# Patient Record
Sex: FEMALE | Race: WHITE | NOT HISPANIC OR LATINO | Employment: UNEMPLOYED | URBAN - METROPOLITAN AREA
[De-identification: names, ages, dates, MRNs, and addresses within clinical notes are randomized per-mention and may not be internally consistent; named-entity substitution may affect disease eponyms.]

---
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
## 2022-07-18 ENCOUNTER — TELEPHONE (OUTPATIENT)
Dept: GASTROENTEROLOGY | Facility: CLINIC | Age: 22
End: 2022-07-18
Payer: MEDICAID

## 2022-07-18 NOTE — TELEPHONE ENCOUNTER
Gave appt for 9/9/2022 with Mariajose Morales.      ----- Message from Angie Reyes-Ruiz, MA sent at 7/18/2022 11:51 AM CDT -----    ----- Message -----  From: Roque Redmond  Sent: 7/18/2022  11:50 AM CDT  To: Effie Billy Staff    Vesta Ye is referring the following patient to Mariajose. The patient's diagnosis is (generalized colicky abdominal pain).     The patients primary insurance is Medicaid insurance. When I attempted to schedule an appointment in Epic, the system could not find an available slot.     If possible, can you please confirm if there are available appointments or if we will need to decline the following referral? I will notify the referring provider.     Thank you,     Roque Redmond  [facility]

## 2022-09-09 ENCOUNTER — OFFICE VISIT (OUTPATIENT)
Dept: GASTROENTEROLOGY | Facility: CLINIC | Age: 22
End: 2022-09-09
Payer: MEDICAID

## 2022-09-09 VITALS
HEART RATE: 80 BPM | BODY MASS INDEX: 22.83 KG/M2 | OXYGEN SATURATION: 97 % | WEIGHT: 120.94 LBS | HEIGHT: 61 IN | DIASTOLIC BLOOD PRESSURE: 59 MMHG | SYSTOLIC BLOOD PRESSURE: 116 MMHG

## 2022-09-09 DIAGNOSIS — R14.0 ABDOMINAL BLOATING: ICD-10-CM

## 2022-09-09 DIAGNOSIS — K21.9 GASTROESOPHAGEAL REFLUX DISEASE, UNSPECIFIED WHETHER ESOPHAGITIS PRESENT: Primary | ICD-10-CM

## 2022-09-09 DIAGNOSIS — R10.10 PAIN OF UPPER ABDOMEN: ICD-10-CM

## 2022-09-09 PROCEDURE — 99999 PR PBB SHADOW E&M-EST. PATIENT-LVL III: CPT | Mod: PBBFAC,,, | Performed by: NURSE PRACTITIONER

## 2022-09-09 PROCEDURE — 1159F MED LIST DOCD IN RCRD: CPT | Mod: CPTII,,, | Performed by: NURSE PRACTITIONER

## 2022-09-09 PROCEDURE — 3078F DIAST BP <80 MM HG: CPT | Mod: CPTII,,, | Performed by: NURSE PRACTITIONER

## 2022-09-09 PROCEDURE — 3008F PR BODY MASS INDEX (BMI) DOCUMENTED: ICD-10-PCS | Mod: CPTII,,, | Performed by: NURSE PRACTITIONER

## 2022-09-09 PROCEDURE — 99213 OFFICE O/P EST LOW 20 MIN: CPT | Mod: PBBFAC,PN | Performed by: NURSE PRACTITIONER

## 2022-09-09 PROCEDURE — 1159F PR MEDICATION LIST DOCUMENTED IN MEDICAL RECORD: ICD-10-PCS | Mod: CPTII,,, | Performed by: NURSE PRACTITIONER

## 2022-09-09 PROCEDURE — 3074F PR MOST RECENT SYSTOLIC BLOOD PRESSURE < 130 MM HG: ICD-10-PCS | Mod: CPTII,,, | Performed by: NURSE PRACTITIONER

## 2022-09-09 PROCEDURE — 99204 PR OFFICE/OUTPT VISIT, NEW, LEVL IV, 45-59 MIN: ICD-10-PCS | Mod: S$PBB,,, | Performed by: NURSE PRACTITIONER

## 2022-09-09 PROCEDURE — 3078F PR MOST RECENT DIASTOLIC BLOOD PRESSURE < 80 MM HG: ICD-10-PCS | Mod: CPTII,,, | Performed by: NURSE PRACTITIONER

## 2022-09-09 PROCEDURE — 99204 OFFICE O/P NEW MOD 45 MIN: CPT | Mod: S$PBB,,, | Performed by: NURSE PRACTITIONER

## 2022-09-09 PROCEDURE — 99999 PR PBB SHADOW E&M-EST. PATIENT-LVL III: ICD-10-PCS | Mod: PBBFAC,,, | Performed by: NURSE PRACTITIONER

## 2022-09-09 PROCEDURE — 3074F SYST BP LT 130 MM HG: CPT | Mod: CPTII,,, | Performed by: NURSE PRACTITIONER

## 2022-09-09 PROCEDURE — 3008F BODY MASS INDEX DOCD: CPT | Mod: CPTII,,, | Performed by: NURSE PRACTITIONER

## 2022-09-09 RX ORDER — SERTRALINE HYDROCHLORIDE 50 MG/1
TABLET, FILM COATED ORAL
COMMUNITY
Start: 2022-07-08 | End: 2022-11-18

## 2022-09-09 RX ORDER — BENZOYL PEROXIDE 50 MG/ML
LIQUID TOPICAL
COMMUNITY
Start: 2022-03-09

## 2022-09-09 RX ORDER — DICYCLOMINE HYDROCHLORIDE 10 MG/1
10 CAPSULE ORAL 3 TIMES DAILY PRN
COMMUNITY
Start: 2022-07-08 | End: 2022-11-18

## 2022-09-09 RX ORDER — OMEPRAZOLE 40 MG/1
40 CAPSULE, DELAYED RELEASE ORAL DAILY
COMMUNITY
Start: 2022-07-08 | End: 2022-11-18

## 2022-09-09 NOTE — PROGRESS NOTES
GASTROENTEROLOGY CLINIC NOTE    Chief Complaint: The primary encounter diagnosis was Gastroesophageal reflux disease, unspecified whether esophagitis present. Diagnoses of Pain of upper abdomen and Abdominal bloating were also pertinent to this visit.  Referring provider/PCP: Vesta Ye NP    HPI:  Inessa Salgado is a 21 y.o. female who is a new patient to me with a PMH that's significant for abdominal pain and anxiety and is accompanied by her mother. She was referred to us by her PCP and is here today to establish care for abdominal pain, reflux, and bloating.  Reports she has had h/o GI problems since she was 17 yo but feels symptoms have worsened over the last two years since starting Depo-Provera.  Symptoms occur daily; never goes one day without abdominal pain. It waxes and wanes throughout the day but never completely resolves. Pain is primarily located in the upper abdomen and occurs prior to eating and after eating.  Pain described as stabbing and cramping in nature. It is worse with eating and having a bowel movement.  Greasy food and spicy foods trigger symptoms. It is accompanied by bloating, gas, reflux, nausea, regurgitation, and belching.  She is unsure of the duration of the pain.  Bowel movements occur every two days and are hard in consistency. Incomplete emptying with bowel movements but no straining. Denies melena or hematochezia.  She sought care with PCP about two months ago for abdominal bloating and epigastric burning.  At that time bentyl and omeprazole were initiated. Patient is not taking either medication and states she wants to find out what is causing her symptoms not hide the problem.  She is extremely fixated on the fact that the Depo is causing her symptoms.     Treatments Tried:Pepto Bismol, no improvement in symptoms  NSAIDs: No  Anticoagulation or Antiplatelet: No    Prior Upper Endoscopy: No  Prior Colonoscopy: No  Family h/o Colon Cancer: No  Family h/o Crohn's  Disease or Ulcerative Colitis: No  Family h/o Celiac Sprue: No  Abdominal Surgeries: None    Review of Systems   Constitutional:  Negative for weight loss.   HENT:  Negative for sore throat.    Eyes:  Negative for blurred vision.   Respiratory:  Negative for cough.    Cardiovascular:  Negative for chest pain.   Gastrointestinal:  Positive for abdominal pain (upper/epigastric), heartburn, nausea and vomiting. Negative for blood in stool, constipation, diarrhea and melena.   Genitourinary:  Negative for dysuria.   Musculoskeletal:  Negative for myalgias.   Skin:  Negative for rash.   Neurological:  Negative for headaches.   Endo/Heme/Allergies:  Negative for environmental allergies.   Psychiatric/Behavioral:  Negative for suicidal ideas. The patient is not nervous/anxious.      Past Medical History: has a past medical history of Seizures.    Past Surgical History: has no past surgical history on file.    Family History:family history includes Heart disease in her mother; Hyperlipidemia in her father and mother; Hypertension in her father.    Allergies:   Review of patient's allergies indicates:   Allergen Reactions    Augmentin [amoxicillin-pot clavulanate] Hives    Morphine     Suprax [cefixime]        Social History: reports that she has never smoked. She has never used smokeless tobacco. She reports that she does not currently use alcohol. She reports that she does not use drugs.    Home medications:   Current Outpatient Medications on File Prior to Visit   Medication Sig Dispense Refill    benzoyl peroxide 5 % external liquid WASH FACE EVERY DAY AS DIRECTED      medroxyPROGESTERone (DEPO-PROVERA) 150 mg/mL Syrg INJECT ONE ML EVERY 12 WEEKS      dicyclomine (BENTYL) 10 MG capsule Take 10 mg by mouth 3 (three) times daily as needed.      omeprazole (PRILOSEC) 40 MG capsule Take 40 mg by mouth once daily.      sertraline (ZOLOFT) 50 MG tablet Take by mouth.      [DISCONTINUED] ondansetron (ZOFRAN-ODT) 4 MG TbDL Take  "1 tablet (4 mg total) by mouth every 6 (six) hours as needed (nausea and/or vomiting). (Patient not taking: Reported on 9/9/2022) 16 tablet 0     No current facility-administered medications on file prior to visit.       Vital signs:  BP (!) 116/59 (BP Location: Right arm, Patient Position: Lying, BP Method: Medium (Automatic))   Pulse 80   Ht 5' 1" (1.549 m)   Wt 54.9 kg (120 lb 14.8 oz)   LMP  (LMP Unknown)   SpO2 97%   BMI 22.85 kg/m²     Physical Exam  Vitals reviewed.   Constitutional:       General: She is not in acute distress.     Appearance: Normal appearance. She is not ill-appearing.   HENT:      Head: Normocephalic.   Cardiovascular:      Rate and Rhythm: Normal rate and regular rhythm.      Heart sounds: Normal heart sounds. No murmur heard.  Pulmonary:      Effort: Pulmonary effort is normal. No respiratory distress.      Breath sounds: Normal breath sounds.   Chest:      Chest wall: No tenderness.   Abdominal:      General: Bowel sounds are normal. There is no distension.      Palpations: Abdomen is soft.      Tenderness: There is no abdominal tenderness. Negative signs include Arellano's sign.      Hernia: No hernia is present.   Skin:     General: Skin is warm.   Neurological:      Mental Status: She is alert and oriented to person, place, and time.   Psychiatric:         Mood and Affect: Mood normal.         Behavior: Behavior normal.       Routine labs:  Lab Results   Component Value Date    WBC 10.00 04/09/2021    HGB 12.4 04/09/2021    HCT 38.2 04/09/2021    MCV 83 04/09/2021     04/09/2021     No results found for: INR  No results found for: IRON, FERRITIN, TIBC, FESATURATED  Lab Results   Component Value Date     04/09/2021    K 4.1 04/09/2021     04/09/2021    CO2 26 04/09/2021    BUN 14 04/09/2021    CREATININE 0.7 04/09/2021     Lab Results   Component Value Date    ALBUMIN 4.3 04/09/2021    ALT 24 04/09/2021    AST 19 04/09/2021    ALKPHOS 94 04/09/2021    BILITOT " 0.3 04/09/2021     No results found for: GLUCOSE  No results found for: TSH  Lab Results   Component Value Date    CALCIUM 9.0 04/09/2021       Imaging:      I have reviewed prior labs, imaging, and notes.      Assessment:  1. Gastroesophageal reflux disease, unspecified whether esophagitis present    2. Pain of upper abdomen    3. Abdominal bloating        Plan:  Orders Placed This Encounter    X-Ray Abdomen AP 1 View    Pregnancy, urine rapid    Case Request Endoscopy: EGD (ESOPHAGOGASTRODUODENOSCOPY)     Abdominal xray to evaluate stool burden  EGD to further evaluate bloating, reflux, and epigastric pain. Consider biopsy for H.pylori and/or celiac sprue.  Trial IBgard    Recommend starting omeprazole prescribed by PCP. Patient refuses to start medication stating she does not want to keep taking medications without knowing what's causing her problem. Explained medications will not mask the cause of the problem but will help symptoms.   Patient takes sertraline occasionally. Informed she has to take it daily and recommend following up with PCP if she no longer wishes to take it.   Follow up with OBGYN to discuss other contraceptive measures as she strongly believes the Depo is causing her symptoms     Plan of care discussed with patient who is in agreement and verbalized understanding.     I have explained the planned procedures to the patient.The risks, benefits and alternatives of the procedure were also explained in detail. Patient verbalized understanding, all questions were answered. The patient agrees to proceed as planned    Follow Up: As Needed Pending Above Workup          Mariajose Chou, ABELARDO,FNP-BC  [facility]

## 2022-11-15 ENCOUNTER — TELEPHONE (OUTPATIENT)
Dept: GASTROENTEROLOGY | Facility: CLINIC | Age: 22
End: 2022-11-15
Payer: MEDICAID

## 2022-11-15 NOTE — TELEPHONE ENCOUNTER
Patient given the results. Patient has appt with Mariajose Morales NP on 1/13/2023    ----- Message from Sarai Partida MA sent at 11/15/2022 11:26 AM CST -----    ----- Message -----  From: Shakira Zazueta  Sent: 11/15/2022  11:19 AM CST  To: Jose Trujillo Staff    Type:  Test Results    Who Called: pt   Name of Test (Lab/Mammo/Etc):   Date of Test: 11/3  Ordering Provider:   Where the test was performed: [facility]   Would the patient rather a call back or a response via [facility]? Call   Best Call Back Number: [phone]  Additional Information:  Pt calling to follow up on her results. Pt states on mychart it says gastric antral and body mucosa. Pt wants to see what are her next steps and if she needs medication.

## 2022-11-16 ENCOUNTER — TELEPHONE (OUTPATIENT)
Dept: GASTROENTEROLOGY | Facility: CLINIC | Age: 22
End: 2022-11-16
Payer: MEDICAID

## 2022-11-16 NOTE — TELEPHONE ENCOUNTER
Gave patient appt for Mariajose Morales on 11/18/2022 at 10:00 am.      ----- Message from Yfn Marcos MA sent at 11/16/2022  9:59 AM CST -----  Regarding: FW: Urgent  Contact: [phone]    ----- Message -----  From: Lisset Lo  Sent: 11/16/2022   9:55 AM CST  To: Effie Billy Staff  Subject: Urgent                                           Renee ( Mother) is calling on behalf of Inessa because she calling stating that she has been throwing up since yesterday and this morning. She had surgery on the 11/3. She's requesting for a soon appointment. Please call to discuss further @ [phone]

## 2022-11-16 NOTE — TELEPHONE ENCOUNTER
----- Message from Ronnie Garcia MD sent at 11/15/2022 12:15 PM CST -----  Pathologic findings for recent EGD reviewed.  No evidence of H pylori infection identified.  Continue current medications.  Follow up as needed with Mariajose

## 2022-11-18 ENCOUNTER — OFFICE VISIT (OUTPATIENT)
Dept: GASTROENTEROLOGY | Facility: CLINIC | Age: 22
End: 2022-11-18
Payer: MEDICAID

## 2022-11-18 VITALS — BODY MASS INDEX: 22.75 KG/M2 | WEIGHT: 120.5 LBS | HEIGHT: 61 IN

## 2022-11-18 DIAGNOSIS — R11.2 NAUSEA AND VOMITING, UNSPECIFIED VOMITING TYPE: ICD-10-CM

## 2022-11-18 DIAGNOSIS — R14.0 ABDOMINAL BLOATING: Primary | ICD-10-CM

## 2022-11-18 DIAGNOSIS — R10.84 GENERALIZED ABDOMINAL PAIN: ICD-10-CM

## 2022-11-18 PROCEDURE — 99999 PR PBB SHADOW E&M-EST. PATIENT-LVL III: CPT | Mod: PBBFAC,,, | Performed by: NURSE PRACTITIONER

## 2022-11-18 PROCEDURE — 3008F PR BODY MASS INDEX (BMI) DOCUMENTED: ICD-10-PCS | Mod: CPTII,,, | Performed by: NURSE PRACTITIONER

## 2022-11-18 PROCEDURE — 1159F PR MEDICATION LIST DOCUMENTED IN MEDICAL RECORD: ICD-10-PCS | Mod: CPTII,,, | Performed by: NURSE PRACTITIONER

## 2022-11-18 PROCEDURE — 99999 PR PBB SHADOW E&M-EST. PATIENT-LVL III: ICD-10-PCS | Mod: PBBFAC,,, | Performed by: NURSE PRACTITIONER

## 2022-11-18 PROCEDURE — 1159F MED LIST DOCD IN RCRD: CPT | Mod: CPTII,,, | Performed by: NURSE PRACTITIONER

## 2022-11-18 PROCEDURE — 99214 PR OFFICE/OUTPT VISIT, EST, LEVL IV, 30-39 MIN: ICD-10-PCS | Mod: S$PBB,,, | Performed by: NURSE PRACTITIONER

## 2022-11-18 PROCEDURE — 99213 OFFICE O/P EST LOW 20 MIN: CPT | Mod: PBBFAC,PN | Performed by: NURSE PRACTITIONER

## 2022-11-18 PROCEDURE — 99214 OFFICE O/P EST MOD 30 MIN: CPT | Mod: S$PBB,,, | Performed by: NURSE PRACTITIONER

## 2022-11-18 PROCEDURE — 3008F BODY MASS INDEX DOCD: CPT | Mod: CPTII,,, | Performed by: NURSE PRACTITIONER

## 2022-11-18 RX ORDER — LEVONORGESTREL AND ETHINYL ESTRADIOL 0.1-0.02MG
1 KIT ORAL DAILY
COMMUNITY
Start: 2022-11-12 | End: 2023-09-29

## 2022-11-18 RX ORDER — OMEPRAZOLE 40 MG/1
40 CAPSULE, DELAYED RELEASE ORAL DAILY
Qty: 30 CAPSULE | Refills: 11 | Status: SHIPPED | OUTPATIENT
Start: 2022-11-18 | End: 2023-09-29

## 2022-11-18 NOTE — PATIENT INSTRUCTIONS
Omeprazole instruction:    Capsule may be opened and contents mixed with 1 tablespoon of applesauce (soft enough to swallow without chewing). Swallow immediately with a glass of cool water; mixture should not be chewed, crushed, warmed, or saved for future use.

## 2022-11-18 NOTE — PROGRESS NOTES
GASTROENTEROLOGY CLINIC NOTE    Chief Complaint: The primary encounter diagnosis was Abdominal bloating. A diagnosis of Nausea and vomiting, unspecified vomiting type was also pertinent to this visit.  Referring provider/PCP: Vesta Ye NP    HPI:  Inessa Salgado is a 22 y.o. female who is a new patient to me with a PMH that's significant for abdominal pain and anxiety and is accompanied by her mother. She was referred to us by her PCP and is here today to establish care for abdominal pain, reflux, and bloating.  Reports she has had h/o GI problems since she was 15 yo but feels symptoms have worsened over the last two years since starting Depo-Provera.  Symptoms occur daily; never goes one day without abdominal pain. It waxes and wanes throughout the day but never completely resolves. Pain is primarily located in the upper abdomen and occurs prior to eating and after eating.  Pain described as stabbing and cramping in nature. It is worse with eating and having a bowel movement.  Greasy food and spicy foods trigger symptoms. It is accompanied by bloating, gas, reflux, nausea, regurgitation, and belching.  She is unsure of the duration of the pain.  Bowel movements occur every two days and are hard in consistency. Incomplete emptying with bowel movements but no straining. Denies melena or hematochezia.  She sought care with PCP about two months ago for abdominal bloating and epigastric burning.  At that time bentyl and omeprazole were initiated. Patient is not taking either medication and states she wants to find out what is causing her symptoms not hide the problem.  She is extremely fixated on the fact that the Depo is causing her symptoms.     Interval Note 11/18/2022  Inessa Salgado who is known to me presents for follow up regarding abdominal pain, bloating, nausea, and vomiting. She is accompanied by her mother. Following her last appointment, it was recommended she start omeprazole to help  "with bloating/reflux.  She has not taken the omeprazole.  She also underwent EGD with Dr. Garcia which was unrevealing.  She continues to experience mid to lower abdominal pain described as stabbing/cramping in nature.  It is accompanied by nausea, vomiting, belching, decreased appetite, and early satiety.  Bowel movements occur every two days and are generally soft in consistency.  Sometimes will have Paullina Type 1 bowel movements. Has not noted correlation with symptoms and harder stools.  Some improvement in abdominal pain noted after bowel movements.  Nausea and vomiting primarily occur with bowel movements.  Describes "flare" abdominal pain, urge to have bowel movement, nausea, then vomiting during bowel movement. Vomitus consists of mucus, liquid, and some old food from previous meals.  Nausea mostly occurs prior to bowel movement but may also occur after eating. No melena or hematochezia, or nocturnal symptoms.  Denies frequent NSAID use, ETOH use, THC, or caffeine use.       Treatments Tried:Pepto Bismol, no improvement in symptoms  NSAIDs: No  Anticoagulation or Antiplatelet: No    Prior Upper Endoscopy: 11/3/2022 with Dr. Garcia  Impression:            - Normal esophagus.                          - Normal stomach. Biopsied.                          - Normal examined duodenum.     Recommendation:        - Discharge patient to home.                          - Resume previous diet.                          - Continue present medications.                          - Await pathology results.                          - Return to nurse practitioner PRN.  Pathology:  Stomach, biopsies:   - Gastric antral and body mucosa with no histopathologic abnormalities    Comment:  Negative for atrophy or intestinal metaplasia.  Negative for   dysplasia or malignancy.     Prior Colonoscopy: No  Family h/o Colon Cancer: No  Family h/o Crohn's Disease or Ulcerative Colitis: No  Family h/o Celiac Sprue: No  Abdominal " Surgeries: None    Review of Systems   Constitutional:  Negative for weight loss.   HENT:  Negative for sore throat.    Eyes:  Negative for blurred vision.   Respiratory:  Negative for cough.    Cardiovascular:  Negative for chest pain.   Gastrointestinal:  Positive for abdominal pain (upper/epigastric), heartburn, nausea and vomiting. Negative for blood in stool, constipation, diarrhea and melena.   Genitourinary:  Negative for dysuria.   Musculoskeletal:  Negative for myalgias.   Skin:  Negative for rash.   Neurological:  Negative for headaches.   Endo/Heme/Allergies:  Negative for environmental allergies.   Psychiatric/Behavioral:  Negative for suicidal ideas. The patient is not nervous/anxious.      Past Medical History: has a past medical history of Seizures.    Past Surgical History: has a past surgical history that includes Esophagogastroduodenoscopy (11/03/2022) and Esophagogastroduodenoscopy (N/A, 11/3/2022).    Family History:family history includes Heart disease in her mother; Hyperlipidemia in her father and mother; Hypertension in her father.    Allergies:   Review of patient's allergies indicates:   Allergen Reactions    Augmentin [amoxicillin-pot clavulanate] Hives    Morphine     Suprax [cefixime]        Social History: reports that she has never smoked. She has never used smokeless tobacco. She reports current alcohol use. She reports that she does not use drugs.    Home medications:   Current Outpatient Medications on File Prior to Visit   Medication Sig Dispense Refill    BALCOLTRA 0.1 mg-0.02 mg (21)/36.5 mg(7) Tab Take 1 tablet by mouth once daily.      benzoyl peroxide 5 % external liquid WASH FACE EVERY DAY AS DIRECTED      [DISCONTINUED] dicyclomine (BENTYL) 10 MG capsule Take 10 mg by mouth 3 (three) times daily as needed.      [DISCONTINUED] medroxyPROGESTERone (DEPO-PROVERA) 150 mg/mL Syrg INJECT ONE ML EVERY 12 WEEKS      [DISCONTINUED] omeprazole (PRILOSEC) 40 MG capsule Take 40 mg by  "mouth once daily.      [DISCONTINUED] sertraline (ZOLOFT) 50 MG tablet Take by mouth.       Current Facility-Administered Medications on File Prior to Visit   Medication Dose Route Frequency Provider Last Rate Last Admin    0.9%  NaCl infusion   Intravenous Continuous Ronnie Garcia MD        LIDOcaine (PF) 10 mg/ml (1%) injection 10 mg  1 mL Intradermal Once PRN Ronnie Garcia MD           Vital signs:  Ht 5' 1" (1.549 m)   Wt 54.6 kg (120 lb 7.7 oz)   BMI 22.76 kg/m²     Physical Exam  Vitals reviewed.   Constitutional:       General: She is not in acute distress.     Appearance: Normal appearance. She is not ill-appearing.   HENT:      Head: Normocephalic.   Cardiovascular:      Rate and Rhythm: Normal rate and regular rhythm.      Heart sounds: Normal heart sounds. No murmur heard.  Pulmonary:      Effort: Pulmonary effort is normal. No respiratory distress.      Breath sounds: Normal breath sounds.   Chest:      Chest wall: No tenderness.   Abdominal:      General: Bowel sounds are normal. There is no distension.      Palpations: Abdomen is soft.      Tenderness: There is no abdominal tenderness. Negative signs include Arellano's sign.      Hernia: No hernia is present.   Skin:     General: Skin is warm.   Neurological:      Mental Status: She is alert and oriented to person, place, and time.   Psychiatric:         Mood and Affect: Mood normal.         Behavior: Behavior normal.       Routine labs:  Lab Results   Component Value Date    WBC 10.00 04/09/2021    HGB 12.4 04/09/2021    HCT 38.2 04/09/2021    MCV 83 04/09/2021     04/09/2021     No results found for: INR  No results found for: IRON, FERRITIN, TIBC, FESATURATED  Lab Results   Component Value Date     04/09/2021    K 4.1 04/09/2021     04/09/2021    CO2 26 04/09/2021    BUN 14 04/09/2021    CREATININE 0.7 04/09/2021     Lab Results   Component Value Date    ALBUMIN 4.3 04/09/2021    ALT 24 04/09/2021    AST 19 " 04/09/2021    ALKPHOS 94 04/09/2021    BILITOT 0.3 04/09/2021     No results found for: GLUCOSE  No results found for: TSH  Lab Results   Component Value Date    CALCIUM 9.0 04/09/2021       Imaging:      I have reviewed prior labs, imaging, and notes.      Assessment:  1. Abdominal bloating    2. Nausea and vomiting, unspecified vomiting type          Plan:  Orders Placed This Encounter    NM Gastric Emptying    CBC Auto Differential    Comprehensive Metabolic Panel    TSH    Tissue Transglutaminase, IgA    IgA    H. pylori Antibody, IgG    omeprazole (PRILOSEC) 40 MG capsule     CBC, CMP, TSH  Celiac labs, H. Pylori IgG  Gastric Emptying Study  Start omeprazole 40 mg daily.     Plan of care discussed with patient who is in agreement and verbalized understanding.     I have explained the planned procedures to the patient.The risks, benefits and alternatives of the procedure were also explained in detail. Patient verbalized understanding, all questions were answered. The patient agrees to proceed as planned    Follow Up: 2 months          ABELARDO Solis,FNP-BC  [facility] [place]/[facility] [place]

## 2023-01-20 ENCOUNTER — OFFICE VISIT (OUTPATIENT)
Dept: GASTROENTEROLOGY | Facility: CLINIC | Age: 23
End: 2023-01-20
Payer: MEDICAID

## 2023-01-20 VITALS
HEIGHT: 61 IN | DIASTOLIC BLOOD PRESSURE: 52 MMHG | BODY MASS INDEX: 23.14 KG/M2 | WEIGHT: 122.56 LBS | SYSTOLIC BLOOD PRESSURE: 104 MMHG | OXYGEN SATURATION: 99 % | HEART RATE: 64 BPM

## 2023-01-20 DIAGNOSIS — K59.00 CONSTIPATION, UNSPECIFIED CONSTIPATION TYPE: ICD-10-CM

## 2023-01-20 DIAGNOSIS — R10.10 PAIN OF UPPER ABDOMEN: Primary | ICD-10-CM

## 2023-01-20 PROCEDURE — 3008F BODY MASS INDEX DOCD: CPT | Mod: CPTII,,, | Performed by: NURSE PRACTITIONER

## 2023-01-20 PROCEDURE — 3078F PR MOST RECENT DIASTOLIC BLOOD PRESSURE < 80 MM HG: ICD-10-PCS | Mod: CPTII,,, | Performed by: NURSE PRACTITIONER

## 2023-01-20 PROCEDURE — 99213 OFFICE O/P EST LOW 20 MIN: CPT | Mod: PBBFAC,PN | Performed by: NURSE PRACTITIONER

## 2023-01-20 PROCEDURE — 99214 PR OFFICE/OUTPT VISIT, EST, LEVL IV, 30-39 MIN: ICD-10-PCS | Mod: S$PBB,,, | Performed by: NURSE PRACTITIONER

## 2023-01-20 PROCEDURE — 3074F PR MOST RECENT SYSTOLIC BLOOD PRESSURE < 130 MM HG: ICD-10-PCS | Mod: CPTII,,, | Performed by: NURSE PRACTITIONER

## 2023-01-20 PROCEDURE — 99999 PR PBB SHADOW E&M-EST. PATIENT-LVL III: CPT | Mod: PBBFAC,,, | Performed by: NURSE PRACTITIONER

## 2023-01-20 PROCEDURE — 3008F PR BODY MASS INDEX (BMI) DOCUMENTED: ICD-10-PCS | Mod: CPTII,,, | Performed by: NURSE PRACTITIONER

## 2023-01-20 PROCEDURE — 99999 PR PBB SHADOW E&M-EST. PATIENT-LVL III: ICD-10-PCS | Mod: PBBFAC,,, | Performed by: NURSE PRACTITIONER

## 2023-01-20 PROCEDURE — 99214 OFFICE O/P EST MOD 30 MIN: CPT | Mod: S$PBB,,, | Performed by: NURSE PRACTITIONER

## 2023-01-20 PROCEDURE — 3074F SYST BP LT 130 MM HG: CPT | Mod: CPTII,,, | Performed by: NURSE PRACTITIONER

## 2023-01-20 PROCEDURE — 3078F DIAST BP <80 MM HG: CPT | Mod: CPTII,,, | Performed by: NURSE PRACTITIONER

## 2023-01-20 NOTE — PROGRESS NOTES
GASTROENTEROLOGY CLINIC NOTE    Chief Complaint: The primary encounter diagnosis was Pain of upper abdomen. A diagnosis of Constipation, unspecified constipation type was also pertinent to this visit.  Referring provider/PCP: Vesta Ye NP    HPI:  Inessa Salgado is a 22 y.o. female who is a new patient to me with a PMH that's significant for abdominal pain and anxiety and is accompanied by her mother. She was referred to us by her PCP and is here today to establish care for abdominal pain, reflux, and bloating.  Reports she has had h/o GI problems since she was 17 yo but feels symptoms have worsened over the last two years since starting Depo-Provera.  Symptoms occur daily; never goes one day without abdominal pain. It waxes and wanes throughout the day but never completely resolves. Pain is primarily located in the upper abdomen and occurs prior to eating and after eating.  Pain described as stabbing and cramping in nature. It is worse with eating and having a bowel movement.  Greasy food and spicy foods trigger symptoms. It is accompanied by bloating, gas, reflux, nausea, regurgitation, and belching.  She is unsure of the duration of the pain.  Bowel movements occur every two days and are hard in consistency. Incomplete emptying with bowel movements but no straining. Denies melena or hematochezia.  She sought care with PCP about two months ago for abdominal bloating and epigastric burning.  At that time bentyl and omeprazole were initiated. Patient is not taking either medication and states she wants to find out what is causing her symptoms not hide the problem.  She is extremely fixated on the fact that the Depo is causing her symptoms.     Interval Note 11/18/2022  Inessa Salgado who is known to me presents for follow up regarding abdominal pain, bloating, nausea, and vomiting. She is accompanied by her mother. Following her last appointment, it was recommended she start omeprazole to help  "with bloating/reflux.  She has not taken the omeprazole.  She also underwent EGD with Dr. Garcia which was unrevealing.  She continues to experience mid to lower abdominal pain described as stabbing/cramping in nature.  It is accompanied by nausea, vomiting, belching, decreased appetite, and early satiety.  Bowel movements occur every two days and are generally soft in consistency.  Sometimes will have [place] Type 1 bowel movements. Has not noted correlation with symptoms and harder stools.  Some improvement in abdominal pain noted after bowel movements.  Nausea and vomiting primarily occur with bowel movements.  Describes "flare" abdominal pain, urge to have bowel movement, nausea, then vomiting during bowel movement. Vomitus consists of mucus, liquid, and some old food from previous meals.  Nausea mostly occurs prior to bowel movement but may also occur after eating. No melena or hematochezia, or nocturnal symptoms.  Denies frequent NSAID use, ETOH use, THC, or caffeine use.     Interval Note 1/20/2023  Inessa Salgado who is known to me presents to clinic for follow up for abdominal pain, nausea, and vomiting.  She is accompanied by her mother and boyfriend.  Following her last appointment, it was recommended she start omeprazole daily and she was sent for gastric emptying study. Gastric emptying study was normal. Celiac labs and H.pylori were also obtained and were normal.  Today she reports constipation, straining, and generalized abdominal pain with bowel movements.  Nausea and vomiting occur with eating greasy food and when having a bowel movement.  Bowel movements occur every 2 days and are small and hard in consistency.  She is no longer taking omeprazole b/c she may be pregnant.  Stopped birth control in December and has not had menstrual cycle since November.  Unable to tell me if omeprazole helped symptoms as she did not take in consistently.      Treatments Tried:Pepto Bismol, Bentyl, Omeprazole, " IBgard  NSAIDs: No  Anticoagulation or Antiplatelet: No    Prior Upper Endoscopy: 11/3/2022 with Dr. Garcia  Impression:            - Normal esophagus.                          - Normal stomach. Biopsied.                          - Normal examined duodenum.     Recommendation:        - Discharge patient to home.                          - Resume previous diet.                          - Continue present medications.                          - Await pathology results.                          - Return to nurse practitioner PRN.  Pathology:  Stomach, biopsies:   - Gastric antral and body mucosa with no histopathologic abnormalities    Comment:  Negative for atrophy or intestinal metaplasia.  Negative for   dysplasia or malignancy.     Prior Colonoscopy: No  Family h/o Colon Cancer: No  Family h/o Crohn's Disease or Ulcerative Colitis: No  Family h/o Celiac Sprue: No  Abdominal Surgeries: None    Review of Systems   Constitutional:  Negative for weight loss.   HENT:  Negative for sore throat.    Eyes:  Negative for blurred vision.   Respiratory:  Negative for cough.    Cardiovascular:  Negative for chest pain.   Gastrointestinal:  Positive for abdominal pain (upper/epigastric), heartburn, nausea and vomiting. Negative for blood in stool, constipation, diarrhea and melena.   Genitourinary:  Negative for dysuria.   Musculoskeletal:  Negative for myalgias.   Skin:  Negative for rash.   Neurological:  Negative for headaches.   Endo/Heme/Allergies:  Negative for environmental allergies.   Psychiatric/Behavioral:  Negative for suicidal ideas. The patient is not nervous/anxious.      Past Medical History: has a past medical history of Seizures.    Past Surgical History: has a past surgical history that includes Esophagogastroduodenoscopy (11/03/2022) and Esophagogastroduodenoscopy (N/A, 11/3/2022).    Family History:family history includes Heart disease in her mother; Hyperlipidemia in her father and mother; Hypertension in  "her father.    Allergies:   Review of patient's allergies indicates:   Allergen Reactions    Augmentin [amoxicillin-pot clavulanate] Hives    Morphine     Suprax [cefixime]        Social History: reports that she has never smoked. She has never used smokeless tobacco. She reports current alcohol use. She reports that she does not use drugs.    Home medications:   Current Outpatient Medications on File Prior to Visit   Medication Sig Dispense Refill    BALCOLTRA 0.1 mg-0.02 mg (21)/36.5 mg(7) Tab Take 1 tablet by mouth once daily.      benzoyl peroxide 5 % external liquid WASH FACE EVERY DAY AS DIRECTED      omeprazole (PRILOSEC) 40 MG capsule Take 1 capsule (40 mg total) by mouth once daily. 30 capsule 11     Current Facility-Administered Medications on File Prior to Visit   Medication Dose Route Frequency Provider Last Rate Last Admin    0.9%  NaCl infusion   Intravenous Continuous Ronnie Garcia MD        LIDOcaine (PF) 10 mg/ml (1%) injection 10 mg  1 mL Intradermal Once PRN Ronnie Garcia MD           Vital signs:  BP (!) 104/52 (BP Location: Left arm, Patient Position: Sitting, BP Method: Medium (Automatic))   Pulse 64   Ht 5' 1" (1.549 m)   Wt 55.6 kg (122 lb 9.2 oz)   LMP 12/10/2022   SpO2 99%   BMI 23.16 kg/m²     Physical Exam  Vitals reviewed.   Constitutional:       General: She is not in acute distress.     Appearance: Normal appearance. She is not ill-appearing.   HENT:      Head: Normocephalic.   Cardiovascular:      Rate and Rhythm: Normal rate and regular rhythm.      Heart sounds: Normal heart sounds. No murmur heard.  Pulmonary:      Effort: Pulmonary effort is normal. No respiratory distress.      Breath sounds: Normal breath sounds.   Chest:      Chest wall: No tenderness.   Abdominal:      General: Bowel sounds are normal. There is no distension.      Palpations: Abdomen is soft.      Tenderness: There is no abdominal tenderness. Negative signs include Arellano's sign.    "   Hernia: No hernia is present.   Skin:     General: Skin is warm.   Neurological:      Mental Status: She is alert and oriented to person, place, and time.   Psychiatric:         Mood and Affect: Mood normal.         Behavior: Behavior normal.       Routine labs:  Lab Results   Component Value Date    WBC 8.21 11/18/2022    HGB 13.2 11/18/2022    HCT 42.4 11/18/2022    MCV 87 11/18/2022     11/18/2022     No results found for: INR  No results found for: IRON, FERRITIN, TIBC, FESATURATED  Lab Results   Component Value Date     11/18/2022    K 4.4 11/18/2022     11/18/2022    CO2 24 11/18/2022    BUN 13 11/18/2022    CREATININE 0.8 11/18/2022     Lab Results   Component Value Date    ALBUMIN 4.3 11/18/2022    ALT 21 11/18/2022    AST 17 11/18/2022    ALKPHOS 94 11/18/2022    BILITOT 0.3 11/18/2022     No results found for: GLUCOSE  Lab Results   Component Value Date    TSH 1.314 11/18/2022     Lab Results   Component Value Date    CALCIUM 9.8 11/18/2022      Latest Reference Range & Units 11/18/22 11:08   H Pylori IgG Interp Negative  Negative @[facility]      Latest Reference Range & Units 11/18/22 11:08   TTG IgA <20 UNITS 9   IgA 40 - 350 mg/dL 137       Imaging:  EXAMINATION:  NM GASTRIC EMPTYING     CLINICAL HISTORY:  Abdominal distension (gaseous)     FINDINGS:  The patient was administered 1.1 mCi of technetium 9 M labeled sulfur colloid with scrambled egg whites 2 pieces of toast jelly and water.     The% gastric emptying at 04:00 hours is 93%, 7% retention.  There is no reflux or aspiration.     Impression:     Normal gastric emptying with 7% retention at 04:00 hours.        Electronically signed by: Valentino Obregon MD  Date:                                            12/19/2022  Time:                                           14:48    I have reviewed prior labs, imaging, and notes.      Assessment:  1. Pain of upper abdomen    2. Constipation, unspecified constipation type             Plan:  Orders Placed This Encounter    US Abdomen Limited     Ultrasound  Suspect IBS playing a role in symptoms.  Will evaluate gallbladder with ultrasound as upper abdominal pain and nausea/vomiting occur following meals nicol heavier meals.    Recommend starting Miralax daily until bowel movements become more regular then take as needed.   Increase water intake.   Offered UPT. Patient declined stating she took home pregnancy test today.  Advised patient to follow up with OBGYN if she does not start her menstrual cycle.       Plan of care discussed with patient who is in agreement and verbalized understanding.     I have explained the planned procedures to the patient.The risks, benefits and alternatives of the procedure were also explained in detail. Patient verbalized understanding, all questions were answered. The patient agrees to proceed as planned    Follow Up: As Needed          Mariajose Chou, ABELARDO,FNP-BC  [facility]

## 2023-01-20 NOTE — PATIENT INSTRUCTIONS
Abdominal ultrasound    Start Miralax daily until bowel movements become more regular then you can take as needed.

## 2023-01-27 ENCOUNTER — TELEPHONE (OUTPATIENT)
Dept: GASTROENTEROLOGY | Facility: CLINIC | Age: 23
End: 2023-01-27
Payer: MEDICAID

## 2023-01-27 NOTE — TELEPHONE ENCOUNTER
Patient given the results and instructions per Mariajose Morales NP. Patient verbalized understanding.    ----- Message from Mariajose Morales NP sent at 1/26/2023  5:32 PM CST -----  Please let patient know her ultrasound was normal. Her gallbladder is fine and there are no gallstones. Symptoms are due to IBS. She can take Miralax as needed for constipation. I also recommend IBgard and daily Metamucil.

## 2023-02-28 PROBLEM — N39.0 URINARY TRACT INFECTION WITHOUT HEMATURIA: Status: ACTIVE | Noted: 2023-02-28

## 2023-02-28 PROBLEM — R10.9 RIGHT FLANK PAIN: Status: ACTIVE | Noted: 2023-02-28

## 2023-06-05 PROBLEM — N39.0 URINARY TRACT INFECTION WITHOUT HEMATURIA: Status: RESOLVED | Noted: 2023-02-28 | Resolved: 2023-06-05

## 2023-06-16 ENCOUNTER — NURSE TRIAGE (OUTPATIENT)
Dept: ADMINISTRATIVE | Facility: CLINIC | Age: 23
End: 2023-06-16
Payer: MEDICAID

## 2023-06-16 NOTE — TELEPHONE ENCOUNTER
Pt's Mother calls on behalf of pt who is 10 weeks pregnant and was told yesterday by her OBGYN that the baby is . Pt's Mother states that she was told that she would have to wait for an extended period of time before a D&C could be performed. Mother notes that pt currently is extremely pale and weak. Pt then comes on the line and states that she feels too weak to stand and is experiencing cold, pale, clammy skin. She denies any bleeding at this time. Pt's Mother asks if a D&C could be scheduled at [facility] asap. They agree to answer triage questions based upon pt's symptoms.     Care Advice recommends that pt call  now. Both pt and pt's Mother verbalize understanding and are instructed to call back with any new/worsening sxs, questions, or concerns.   Reason for Disposition   Shock suspected (e.g., cold/pale/clammy skin, too weak to stand, low BP, rapid pulse)    Protocols used:  - Threatened Miscarriage Follow-up Call-A-[place]

## 2023-06-17 ENCOUNTER — HOSPITAL ENCOUNTER (EMERGENCY)
Facility: OTHER | Age: 23
Discharge: HOME | End: 2023-06-17
Attending: EMERGENCY MEDICINE
Payer: MEDICAID

## 2023-06-17 VITALS
HEIGHT: 61 IN | DIASTOLIC BLOOD PRESSURE: 58 MMHG | WEIGHT: 126 LBS | RESPIRATION RATE: 18 BRPM | SYSTOLIC BLOOD PRESSURE: 109 MMHG | HEART RATE: 75 BPM | TEMPERATURE: 98 F | OXYGEN SATURATION: 99 % | BODY MASS INDEX: 23.79 KG/M2

## 2023-06-17 DIAGNOSIS — O02.1 MISSED ABORTION: Primary | ICD-10-CM

## 2023-06-17 LAB
ABO + RH BLD: NORMAL
ALBUMIN SERPL BCP-MCNC: 3.7 G/DL (ref 3.5–5.2)
ALP SERPL-CCNC: 82 U/L (ref 55–135)
ALT SERPL W/O P-5'-P-CCNC: 18 U/L (ref 10–44)
ANION GAP SERPL CALC-SCNC: 9 MMOL/L (ref 8–16)
AST SERPL-CCNC: 15 U/L (ref 10–40)
B-HCG UR QL: POSITIVE
BASOPHILS # BLD AUTO: 0.04 K/UL (ref 0–0.2)
BASOPHILS NFR BLD: 0.4 % (ref 0–1.9)
BILIRUB SERPL-MCNC: 0.2 MG/DL (ref 0.1–1)
BUN SERPL-MCNC: 9 MG/DL (ref 6–20)
CALCIUM SERPL-MCNC: 9.5 MG/DL (ref 8.7–10.5)
CHLORIDE SERPL-SCNC: 105 MMOL/L (ref 95–110)
CO2 SERPL-SCNC: 21 MMOL/L (ref 23–29)
CREAT SERPL-MCNC: 0.7 MG/DL (ref 0.5–1.4)
CTP QC/QA: YES
DIFFERENTIAL METHOD: ABNORMAL
EOSINOPHIL # BLD AUTO: 0.1 K/UL (ref 0–0.5)
EOSINOPHIL NFR BLD: 1 % (ref 0–8)
ERYTHROCYTE [DISTWIDTH] IN BLOOD BY AUTOMATED COUNT: 13 % (ref 11.5–14.5)
EST. GFR  (NO RACE VARIABLE): >60 ML/MIN/1.73 M^2
GLUCOSE SERPL-MCNC: 123 MG/DL (ref 70–110)
HCG INTACT+B SERPL-ACNC: NORMAL MIU/ML
HCT VFR BLD AUTO: 38.9 % (ref 37–48.5)
HGB BLD-MCNC: 12.5 G/DL (ref 12–16)
IMM GRANULOCYTES # BLD AUTO: 0.04 K/UL (ref 0–0.04)
IMM GRANULOCYTES NFR BLD AUTO: 0.4 % (ref 0–0.5)
LYMPHOCYTES # BLD AUTO: 2.3 K/UL (ref 1–4.8)
LYMPHOCYTES NFR BLD: 21.6 % (ref 18–48)
MCH RBC QN AUTO: 27.5 PG (ref 27–31)
MCHC RBC AUTO-ENTMCNC: 32.1 G/DL (ref 32–36)
MCV RBC AUTO: 86 FL (ref 82–98)
MONOCYTES # BLD AUTO: 0.7 K/UL (ref 0.3–1)
MONOCYTES NFR BLD: 6.8 % (ref 4–15)
NEUTROPHILS # BLD AUTO: 7.4 K/UL (ref 1.8–7.7)
NEUTROPHILS NFR BLD: 69.8 % (ref 38–73)
NRBC BLD-RTO: 0 /100 WBC
PLATELET # BLD AUTO: 295 K/UL (ref 150–450)
PMV BLD AUTO: 9 FL (ref 9.2–12.9)
POTASSIUM SERPL-SCNC: 3.7 MMOL/L (ref 3.5–5.1)
PROT SERPL-MCNC: 7.3 G/DL (ref 6–8.4)
RBC # BLD AUTO: 4.54 M/UL (ref 4–5.4)
SODIUM SERPL-SCNC: 135 MMOL/L (ref 136–145)
WBC # BLD AUTO: 10.53 K/UL (ref 3.9–12.7)

## 2023-06-17 PROCEDURE — 84702 CHORIONIC GONADOTROPIN TEST: CPT | Performed by: PHYSICIAN ASSISTANT

## 2023-06-17 PROCEDURE — 63600175 PHARM REV CODE 636 W HCPCS: Performed by: PHYSICIAN ASSISTANT

## 2023-06-17 PROCEDURE — 96374 THER/PROPH/DIAG INJ IV PUSH: CPT

## 2023-06-17 PROCEDURE — 99285 EMERGENCY DEPT VISIT HI MDM: CPT | Mod: 25

## 2023-06-17 PROCEDURE — 25000003 PHARM REV CODE 250: Performed by: PHYSICIAN ASSISTANT

## 2023-06-17 PROCEDURE — 96361 HYDRATE IV INFUSION ADD-ON: CPT

## 2023-06-17 PROCEDURE — 81025 URINE PREGNANCY TEST: CPT | Performed by: EMERGENCY MEDICINE

## 2023-06-17 PROCEDURE — 80053 COMPREHEN METABOLIC PANEL: CPT | Performed by: PHYSICIAN ASSISTANT

## 2023-06-17 PROCEDURE — 85025 COMPLETE CBC W/AUTO DIFF WBC: CPT | Performed by: PHYSICIAN ASSISTANT

## 2023-06-17 PROCEDURE — 86900 BLOOD TYPING SEROLOGIC ABO: CPT

## 2023-06-17 RX ORDER — ONDANSETRON 4 MG/1
4 TABLET, ORALLY DISINTEGRATING ORAL EVERY 6 HOURS PRN
Qty: 30 TABLET | Refills: 2 | Status: SHIPPED | OUTPATIENT
Start: 2023-06-17

## 2023-06-17 RX ORDER — ACETAMINOPHEN 325 MG/1
650 TABLET ORAL EVERY 6 HOURS
Qty: 56 TABLET | Refills: 0 | Status: SHIPPED | OUTPATIENT
Start: 2023-06-17 | End: 2023-06-24

## 2023-06-17 RX ORDER — IBUPROFEN 600 MG/1
600 TABLET ORAL EVERY 6 HOURS
Qty: 28 TABLET | Refills: 0 | OUTPATIENT
Start: 2023-06-17 | End: 2023-06-18

## 2023-06-17 RX ORDER — MISOPROSTOL 200 UG/1
800 TABLET ORAL
Qty: 8 TABLET | Refills: 0 | Status: SHIPPED | OUTPATIENT
Start: 2023-06-17 | End: 2023-09-29

## 2023-06-17 RX ORDER — ONDANSETRON 2 MG/ML
4 INJECTION INTRAMUSCULAR; INTRAVENOUS
Status: COMPLETED | OUTPATIENT
Start: 2023-06-17 | End: 2023-06-17

## 2023-06-17 RX ADMIN — ONDANSETRON 4 MG: 2 INJECTION INTRAMUSCULAR; INTRAVENOUS at 07:06

## 2023-06-17 RX ADMIN — SODIUM CHLORIDE 1000 ML: 9 INJECTION, SOLUTION INTRAVENOUS at 04:06

## 2023-06-17 NOTE — ED TRIAGE NOTES
Pt arrived with c/o lightheadedness since yesterday and one episode of vomiting today.  Pt states LMP 4/1/23, states she had a positive pregnancy test in May.  PT denies any abd pain or vaginal bleeding. Pt answering questions appropriately, speaking in complete sentences, respirations even and unlabored.  Aao x 4.

## 2023-06-17 NOTE — FIRST PROVIDER EVALUATION
Emergency Department TeleTriage Encounter Note      CHIEF COMPLAINT    Chief Complaint   Patient presents with    Fatigue     Took a + pregnancy test in the middle of may, unable to give specific date. Pt is unsure how far along she is. Denies bleeding. Reports feeling fatigued with N/V and increased thirst. Denies other symptoms. Requesting ultrasound and HCG level. Hasn't been able to F/U with obgyn yet.        VITAL SIGNS   Initial Vitals [23 1434]   BP Pulse Resp Temp SpO2   (!) 109/59 93 20 98.4 °F (36.9 °C) 98 %      MAP       --            ALLERGIES    Review of patient's allergies indicates:   Allergen Reactions    Augmentin [amoxicillin-pot clavulanate] Hives    Morphine     Suprax [cefixime]        PROVIDER TRIAGE NOTE  Patient with LMP early April and reports positive pregnancy test about 3 weeks ago. She is complaining of nausea, vomiting and generalized weakness. Denies pelvic pain, back pain or vaginal bleeding       ORDERS  Labs Reviewed - No data to display    ED Orders (720h ago, onward)      None              Virtual Visit Note: The provider triage portion of this emergency department evaluation and documentation was performed via [employer], a HIPAA-compliant telemedicine application, in concert with a tele-presenter in the room. A face to face patient evaluation with one of my colleagues will occur once the patient is placed in an emergency department room.      DISCLAIMER: This note was prepared with [employer] voice recognition transcription software. Garbled syntax, mangled pronouns, and other bizarre constructions may be attributed to that software system.

## 2023-06-18 ENCOUNTER — HOSPITAL ENCOUNTER (EMERGENCY)
Facility: OTHER | Age: 23
Discharge: HOME | End: 2023-06-18
Attending: EMERGENCY MEDICINE
Payer: MEDICAID

## 2023-06-18 ENCOUNTER — NURSE TRIAGE (OUTPATIENT)
Dept: ADMINISTRATIVE | Facility: CLINIC | Age: 23
End: 2023-06-18
Payer: MEDICAID

## 2023-06-18 VITALS
DIASTOLIC BLOOD PRESSURE: 58 MMHG | TEMPERATURE: 98 F | RESPIRATION RATE: 21 BRPM | OXYGEN SATURATION: 97 % | HEART RATE: 102 BPM | SYSTOLIC BLOOD PRESSURE: 112 MMHG

## 2023-06-18 DIAGNOSIS — T50.905A MEDICATION REACTION, INITIAL ENCOUNTER: ICD-10-CM

## 2023-06-18 DIAGNOSIS — R07.9 CHEST PAIN: ICD-10-CM

## 2023-06-18 DIAGNOSIS — O03.9 COMPLETE MISCARRIAGE: Primary | ICD-10-CM

## 2023-06-18 LAB
ALBUMIN SERPL BCP-MCNC: 3 G/DL (ref 3.5–5.2)
ALP SERPL-CCNC: 64 U/L (ref 55–135)
ALT SERPL W/O P-5'-P-CCNC: 15 U/L (ref 10–44)
ANION GAP SERPL CALC-SCNC: 9 MMOL/L (ref 8–16)
AST SERPL-CCNC: 18 U/L (ref 10–40)
BASOPHILS # BLD AUTO: 0.03 K/UL (ref 0–0.2)
BASOPHILS NFR BLD: 0.2 % (ref 0–1.9)
BILIRUB SERPL-MCNC: 0.3 MG/DL (ref 0.1–1)
BUN SERPL-MCNC: 8 MG/DL (ref 6–20)
CALCIUM SERPL-MCNC: 8.2 MG/DL (ref 8.7–10.5)
CHLORIDE SERPL-SCNC: 107 MMOL/L (ref 95–110)
CO2 SERPL-SCNC: 18 MMOL/L (ref 23–29)
CREAT SERPL-MCNC: 0.6 MG/DL (ref 0.5–1.4)
DIFFERENTIAL METHOD: ABNORMAL
EOSINOPHIL # BLD AUTO: 0.1 K/UL (ref 0–0.5)
EOSINOPHIL NFR BLD: 0.5 % (ref 0–8)
ERYTHROCYTE [DISTWIDTH] IN BLOOD BY AUTOMATED COUNT: 13.2 % (ref 11.5–14.5)
EST. GFR  (NO RACE VARIABLE): >60 ML/MIN/1.73 M^2
GLUCOSE SERPL-MCNC: 101 MG/DL (ref 70–110)
HCT VFR BLD AUTO: 35 % (ref 37–48.5)
HGB BLD-MCNC: 11.2 G/DL (ref 12–16)
IMM GRANULOCYTES # BLD AUTO: 0.07 K/UL (ref 0–0.04)
IMM GRANULOCYTES NFR BLD AUTO: 0.5 % (ref 0–0.5)
LYMPHOCYTES # BLD AUTO: 2.7 K/UL (ref 1–4.8)
LYMPHOCYTES NFR BLD: 17.4 % (ref 18–48)
MCH RBC QN AUTO: 27.2 PG (ref 27–31)
MCHC RBC AUTO-ENTMCNC: 32 G/DL (ref 32–36)
MCV RBC AUTO: 85 FL (ref 82–98)
MONOCYTES # BLD AUTO: 1.1 K/UL (ref 0.3–1)
MONOCYTES NFR BLD: 6.9 % (ref 4–15)
NEUTROPHILS # BLD AUTO: 11.6 K/UL (ref 1.8–7.7)
NEUTROPHILS NFR BLD: 74.5 % (ref 38–73)
NRBC BLD-RTO: 0 /100 WBC
PLATELET # BLD AUTO: 310 K/UL (ref 150–450)
PMV BLD AUTO: 10 FL (ref 9.2–12.9)
POTASSIUM SERPL-SCNC: 3.9 MMOL/L (ref 3.5–5.1)
PROT SERPL-MCNC: 5.9 G/DL (ref 6–8.4)
RBC # BLD AUTO: 4.12 M/UL (ref 4–5.4)
SODIUM SERPL-SCNC: 134 MMOL/L (ref 136–145)
WBC # BLD AUTO: 15.5 K/UL (ref 3.9–12.7)

## 2023-06-18 PROCEDURE — 96361 HYDRATE IV INFUSION ADD-ON: CPT

## 2023-06-18 PROCEDURE — 96374 THER/PROPH/DIAG INJ IV PUSH: CPT

## 2023-06-18 PROCEDURE — 63600175 PHARM REV CODE 636 W HCPCS: Performed by: EMERGENCY MEDICINE

## 2023-06-18 PROCEDURE — 88300 PR  SURG PATH,GROSS,LEVEL I: ICD-10-PCS | Mod: 26,,, | Performed by: STUDENT IN AN ORGANIZED HEALTH CARE EDUCATION/TRAINING PROGRAM

## 2023-06-18 PROCEDURE — 93005 ELECTROCARDIOGRAM TRACING: CPT

## 2023-06-18 PROCEDURE — 96375 TX/PRO/DX INJ NEW DRUG ADDON: CPT

## 2023-06-18 PROCEDURE — 80053 COMPREHEN METABOLIC PANEL: CPT | Mod: 91 | Performed by: EMERGENCY MEDICINE

## 2023-06-18 PROCEDURE — 85025 COMPLETE CBC W/AUTO DIFF WBC: CPT | Performed by: EMERGENCY MEDICINE

## 2023-06-18 PROCEDURE — 93010 ELECTROCARDIOGRAM REPORT: CPT | Mod: ,,, | Performed by: INTERNAL MEDICINE

## 2023-06-18 PROCEDURE — 88305 TISSUE EXAM BY PATHOLOGIST: CPT | Performed by: STUDENT IN AN ORGANIZED HEALTH CARE EDUCATION/TRAINING PROGRAM

## 2023-06-18 PROCEDURE — 25000003 PHARM REV CODE 250: Performed by: EMERGENCY MEDICINE

## 2023-06-18 PROCEDURE — 36415 COLL VENOUS BLD VENIPUNCTURE: CPT | Performed by: EMERGENCY MEDICINE

## 2023-06-18 PROCEDURE — 96376 TX/PRO/DX INJ SAME DRUG ADON: CPT

## 2023-06-18 PROCEDURE — 93010 EKG 12-LEAD: ICD-10-PCS | Mod: ,,, | Performed by: INTERNAL MEDICINE

## 2023-06-18 PROCEDURE — 99285 EMERGENCY DEPT VISIT HI MDM: CPT | Mod: 25

## 2023-06-18 PROCEDURE — 88300 SURGICAL PATH GROSS: CPT | Mod: 26,,, | Performed by: STUDENT IN AN ORGANIZED HEALTH CARE EDUCATION/TRAINING PROGRAM

## 2023-06-18 RX ORDER — HYDROMORPHONE HYDROCHLORIDE 1 MG/ML
0.5 INJECTION, SOLUTION INTRAMUSCULAR; INTRAVENOUS; SUBCUTANEOUS
Status: COMPLETED | OUTPATIENT
Start: 2023-06-18 | End: 2023-06-18

## 2023-06-18 RX ORDER — DIPHENHYDRAMINE HYDROCHLORIDE 50 MG/ML
25 INJECTION INTRAMUSCULAR; INTRAVENOUS
Status: COMPLETED | OUTPATIENT
Start: 2023-06-18 | End: 2023-06-18

## 2023-06-18 RX ORDER — IBUPROFEN 800 MG/1
800 TABLET ORAL EVERY 6 HOURS PRN
Qty: 30 TABLET | Refills: 0 | Status: SHIPPED | OUTPATIENT
Start: 2023-06-18 | End: 2023-09-29

## 2023-06-18 RX ORDER — HYDROCODONE BITARTRATE AND ACETAMINOPHEN 5; 325 MG/1; MG/1
1 TABLET ORAL EVERY 4 HOURS PRN
Qty: 12 TABLET | Refills: 0 | Status: SHIPPED | OUTPATIENT
Start: 2023-06-18 | End: 2023-06-28

## 2023-06-18 RX ORDER — KETOROLAC TROMETHAMINE 30 MG/ML
15 INJECTION, SOLUTION INTRAMUSCULAR; INTRAVENOUS
Status: COMPLETED | OUTPATIENT
Start: 2023-06-18 | End: 2023-06-18

## 2023-06-18 RX ADMIN — HYDROMORPHONE HYDROCHLORIDE 0.5 MG: 0.5 INJECTION, SOLUTION INTRAMUSCULAR; INTRAVENOUS; SUBCUTANEOUS at 05:06

## 2023-06-18 RX ADMIN — SODIUM CHLORIDE 1000 ML: 9 INJECTION, SOLUTION INTRAVENOUS at 05:06

## 2023-06-18 RX ADMIN — KETOROLAC TROMETHAMINE 15 MG: 30 INJECTION, SOLUTION INTRAMUSCULAR; INTRAVENOUS at 07:06

## 2023-06-18 RX ADMIN — DIPHENHYDRAMINE HYDROCHLORIDE 25 MG: 50 INJECTION, SOLUTION INTRAMUSCULAR; INTRAVENOUS at 05:06

## 2023-06-18 NOTE — TELEPHONE ENCOUNTER
Pts mother calling with pt, pt crying in background. States the medication isnt working and she is in a lot of pain. States she is having chills and is too weak to stand. Per protocol advised to CALL 911 NOW. verbalized understanding. Denies any further questions or concerns at this time, advised to call back if they have any that come up. Advised pt to call back with any other concerns or worsening symptoms. Verbalized understanding and will route message to provider.     Reason for Disposition   Shock suspected (e.g., cold/pale/clammy skin, too weak to stand, low BP, rapid pulse)    Protocols used:  - Medication Induced Follow-up Call-A-

## 2023-06-18 NOTE — DISCHARGE INSTRUCTIONS
You were seen in the ER and were diagnosed with having a miscarriage.  Ob talked to you about your options and you elected to treat this with Cytotec.  Follow the instructions that they gave you for this treatment.  They prescribed you ibuprofen, Tylenol, and Zofran that you may take as needed for pain and nausea.  Follow up with OB as instructed.  Return to the ER immediately if you began having heavy vaginal bleeding with clots with associated lightheadedness or fever.

## 2023-06-18 NOTE — ED PROVIDER NOTES
Encounter Date: 2023       History     Chief Complaint   Patient presents with    Miscarriage     Pt seen yesterday for abdominal pain, 9 weeks pregnant. No vaginal bleeding. Pt was given 200mcg fentanyl 8 zofran by ems      22-year-old female  presents with complaint of severe abdominal pain.  She was seen in the ER yesterday where it was noted that there were no fetal heart tones present on her ultrasound.  Pain began around 12:30 this afternoon after she took Cytotec pills which were prescribed yesterday for missed .  Pain is rated 10/10, located in the suprapubic area, lower back, and anus, described as severe cramping.  Patient denies any heavy vaginal bleeding.  Patient reports allergy to morphine, but is unsure of reaction and received fentanyl on route by paramedics without event.    Review of patient's allergies indicates:   Allergen Reactions    Augmentin [amoxicillin-pot clavulanate] Hives    Morphine     Suprax [cefixime]      Past Medical History:   Diagnosis Date    Seizures     febrile seizure     Past Surgical History:   Procedure Laterality Date    ESOPHAGOGASTRODUODENOSCOPY  2022    ESOPHAGOGASTRODUODENOSCOPY N/A 11/3/2022    Procedure: EGD (ESOPHAGOGASTRODUODENOSCOPY);  Surgeon: Ronnie Garcia MD;  Location: [facility];  Service: Endoscopy;  Laterality: N/A;     Family History   Problem Relation Age of Onset    Heart disease Mother     Hyperlipidemia Mother     Hypertension Father     Hyperlipidemia Father      Social History     Tobacco Use    Smoking status: Never    Smokeless tobacco: Never   Substance Use Topics    Alcohol use: Yes     Comment: occasionally    Drug use: Never     Review of Systems   Constitutional:  Negative for chills and fever.   HENT:  Negative for congestion and sore throat.    Eyes:  Negative for visual disturbance.   Respiratory:  Negative for cough and shortness of breath.    Cardiovascular:  Negative for chest pain and palpitations.    Gastrointestinal:  Positive for abdominal pain. Negative for diarrhea and vomiting.   Genitourinary:  Positive for pelvic pain. Negative for decreased urine volume, dysuria and vaginal discharge.   Musculoskeletal:  Negative for joint swelling, neck pain and neck stiffness.   Skin:  Negative for rash and wound.   Neurological:  Negative for weakness, numbness and headaches.   Psychiatric/Behavioral:  Negative for confusion.      Physical Exam     Initial Vitals   BP Pulse Resp Temp SpO2   06/18/23 1753 06/18/23 1735 06/18/23 1735 06/18/23 1753 06/18/23 1735   (!) 107/92 82 18 98 °F (36.7 °C) 100 %      MAP       --                Physical Exam    Nursing note and vitals reviewed.  Constitutional: She appears well-developed and well-nourished. She appears distressed.   Crying and moaning.   HENT:   Head: Normocephalic and atraumatic.   Mouth/Throat: Oropharynx is clear and moist. No oropharyngeal exudate.   Eyes: Conjunctivae and EOM are normal.   Neck: Neck supple.   Cardiovascular:  Normal rate and normal heart sounds.           No murmur heard.  Pulmonary/Chest: Breath sounds normal. No respiratory distress. She has no wheezes. She has no rhonchi. She has no rales.   Abdominal: Abdomen is soft. There is abdominal tenderness (Suprapubic). There is no rebound and no guarding.   Genitourinary:    Genitourinary Comments: Pelvic exam:  Normal external genitalia.  There is watery sanguinous fluid in the vaginal vault, clumpy white discharge also present.  Unable to visualize cervix, but upon insertion of the speculum products of conception became visible and were removed.  Cervix is open, no CMT.     Musculoskeletal:         General: No tenderness or edema.      Cervical back: Neck supple.     Neurological: She is alert and oriented to person, place, and time. She has normal strength. GCS score is 15. GCS eye subscore is 4. GCS verbal subscore is 5. GCS motor subscore is 6.   Skin: Skin is warm and dry. No rash noted.    Psychiatric: She has a normal mood and affect. Thought content normal.       ED Course   Procedures  Labs Reviewed   CBC W/ AUTO DIFFERENTIAL - Abnormal; Notable for the following components:       Result Value    WBC 15.50 (*)     Hemoglobin 11.2 (*)     Hematocrit 35.0 (*)     Gran # (ANC) 11.6 (*)     Immature Grans (Abs) 0.07 (*)     Mono # 1.1 (*)     Gran % 74.5 (*)     Lymph % 17.4 (*)     All other components within normal limits   COMPREHENSIVE METABOLIC PANEL - Abnormal; Notable for the following components:    Sodium 134 (*)     CO2 18 (*)     Calcium 8.2 (*)     Total Protein 5.9 (*)     Albumin 3.0 (*)     All other components within normal limits   SPECIMEN TO PATHOLOGY, SURGERY     EKG Readings: (Independently Interpreted)   Normal sinus rhythm at rate of 86, no STEMI.  There is slight sinus arrhythmia present.     Imaging Results              US OB Transvaginal (Final result)  Result time 06/18/23 18:48:59   Procedure changed from US OB <14 Wks TransAbd & TransVag, Single Gestation (XPD)     Final result by Martha Hidalgo MD (06/18/23 18:48:59)                   Impression:      No intrauterine gestational sac.    No abnormal adnexal mass.      Electronically signed by: Martha Hidalgo  Date:    06/18/2023  Time:    18:48               Narrative:    EXAMINATION:  US OB TRANSVAGINAL    CLINICAL HISTORY:  miscarriage;    TECHNIQUE:  Transabdominal 2nd or 3rd trimester obstetrical ultrasound was performed.    COMPARISON:  None.    FINDINGS:  A 35,262 mIU/mL LMP 04/01/2023    Uterus measures 10.6 x 6.2 x 6 cm.  No intrauterine fluid collection/sac.    Is right ovary measures 1.7 x 1.2 x 2.7 cm.  The left ovary was not visualized.                                       Medications   HYDROmorphone injection 0.5 mg (0.5 mg Intravenous Given 6/18/23 1735)   diphenhydrAMINE injection 25 mg (25 mg Intravenous Given 6/18/23 1735)   sodium chloride 0.9% bolus 1,000 mL 1,000 mL (0 mLs Intravenous Stopped  23 1835)   ketorolac injection 15 mg (15 mg Intravenous Given by Other 23 1900)   ketorolac injection 15 mg (15 mg Intravenous Given 23 1938)     Medical Decision Making:   ED Management:  Emergent evaluation a 22-year-old female who presents with abdominal pain after taking Cytotec for a missed .  Pain is described as cramping, but seems out of proportion.  Vital signs are benign, afebrile.  On exam she is distressed and moaning, suprapubic tenderness.  Pelvic exam showed products of conception, after their removal patient's pain improved.  Repeat ultrasound afterwards shows no remaining products in the uterus.  Patient chart shows morphine allergy, she was given fentanyl by paramedics.  She needed additional analgesics and was premedicated with Benadryl prior to receiving Dilaudid.  However, immediately after receiving Dilaudid reported chest pain without EKG changes or changes in vital signs.  I suspect this was an anxiety reaction related to the medication.  She was treated with IV fluids and 2 doses of Toradol with improvement.  Discussed the case with OBGYN who reviewed all labs and imaging.  Patient does have slight decrease in H&H on CBC, but no need for transfusion.  Metabolic profile shows no major electrolyte disturbance or renal insufficiency.  Ultimately she is discharged in improved condition with bleeding precautions, pelvic rest, and will be scheduled to follow-up in resident clinic for reassessment this week.           ED Course as of 23 2300   Sun 2023   171 Immediately after receiving Dilaudid, patient reported chest pain.  I suspect medication reaction.  EKG performed at that time, shows normal sinus rhythm. [AK]   1684 Paged OB to discuss [AK]   9159 I spoke with OBGYN.  They request call back after ultrasound complete. [AK]      ED Course User Index  [AK] Jonelle Hernandez MD                 Clinical Impression:   Final diagnoses:  [R07.9] Chest  pain  [O03.9] Complete miscarriage (Primary)  [T50.908V] Medication reaction, initial encounter        ED Disposition Condition    Discharge Stable          ED Prescriptions       Medication Sig Dispense Start Date End Date Auth. Provider    ibuprofen (ADVIL,MOTRIN) 800 MG tablet Take 1 tablet (800 mg total) by mouth every 6 (six) hours as needed for Pain. 30 tablet 6/18/2023 -- Jonelle Hernandez MD    HYDROcodone-acetaminophen (NORCO) 5-325 mg per tablet Take 1 tablet by mouth every 4 (four) hours as needed for Pain. 12 tablet 6/18/2023 6/28/2023 Jonelle Hernandez MD          Follow-up Information       Follow up With Specialties Details Why Contact Info Additional Information    [facility] Obstetrics and Gynecology  They will call you to schedule a follow-up appointment [address] [facility] [address] [phone] OB GYN - [facility], 4th Floor, [address] Please park in [address] and use [facility] elevators    [facility] - Emergency Dept Emergency Medicine  As needed, If symptoms worsen [address] [facility] [address] [phone]              Jonelle Hernandez MD  06/18/23 3258

## 2023-06-18 NOTE — CONSULTS
Consult Note  Gynecology    Consult Requested By: Regina Akbar   Reason for Consult: Missed ab     SUBJECTIVE:     History of Present Illness:  Patient is a 22 y.o.  presenting estimated 6wk GA by LMP with lightheadedness and one episode of vomiting.     Patient reports her LMP in early May, presented today for lightheadedness and one episode of vomiting. Denies pelvic pain, vaginal bleeding. Denies fevers, nausea.     This pregnancy was desired. Patient presents with her mom at bedside.       PMHx:   Past Medical History:   Diagnosis Date    Seizures     febrile seizure       PSHx:   Past Surgical History:   Procedure Laterality Date    ESOPHAGOGASTRODUODENOSCOPY  2022    ESOPHAGOGASTRODUODENOSCOPY N/A 11/3/2022    Procedure: EGD (ESOPHAGOGASTRODUODENOSCOPY);  Surgeon: Ronnie Garcia MD;  Location: [facility];  Service: Endoscopy;  Laterality: N/A;       All:   Review of patient's allergies indicates:   Allergen Reactions    Augmentin [amoxicillin-pot clavulanate] Hives    Morphine     Suprax [cefixime]        Meds: (Not in a hospital admission)      SH:   Social History     Socioeconomic History    Marital status: Single   Occupational History    Occupation: dental hygene   Tobacco Use    Smoking status: Never    Smokeless tobacco: Never   Substance and Sexual Activity    Alcohol use: Yes     Comment: occasionally    Drug use: Never    Sexual activity: Never       FH:   Family History   Problem Relation Age of Onset    Heart disease Mother     Hyperlipidemia Mother     Hypertension Father     Hyperlipidemia Father        Review of Systems:  Constitutional: no fever or chills  Respiratory: no cough or shortness of breath  Cardiovascular: no chest pain or palpitations  Gastrointestinal: vomiting, no nausea, tolerating diet, negative for change in bowel habits  Genitourinary: no hematuria or dysuria, negative for urinary incontinence     OBJECTIVE:     Temp:  [98.4 °F (36.9 °C)] 98.4 °F (36.9  °C)  Pulse:  [85-93] 85  Resp:  [16-20] 16  SpO2:  [98 %-100 %] 98 %  BP: (101-109)/(58-59) 101/58    Recent Results (from the past 24 hour(s))   CBC auto differential    Collection Time: 06/17/23  2:52 PM   Result Value Ref Range    WBC 10.53 3.90 - 12.70 K/uL    RBC 4.54 4.00 - 5.40 M/uL    Hemoglobin 12.5 12.0 - 16.0 g/dL    Hematocrit 38.9 37.0 - 48.5 %    MCV 86 82 - 98 fL    MCH 27.5 27.0 - 31.0 pg    MCHC 32.1 32.0 - 36.0 g/dL    RDW 13.0 11.5 - 14.5 %    Platelets 295 150 - 450 K/uL    MPV 9.0 (L) 9.2 - 12.9 fL    Immature Granulocytes 0.4 0.0 - 0.5 %    Gran # (ANC) 7.4 1.8 - 7.7 K/uL    Immature Grans (Abs) 0.04 0.00 - 0.04 K/uL    Lymph # 2.3 1.0 - 4.8 K/uL    Mono # 0.7 0.3 - 1.0 K/uL    Eos # 0.1 0.0 - 0.5 K/uL    Baso # 0.04 0.00 - 0.20 K/uL    nRBC 0 0 /100 WBC    Gran % 69.8 38.0 - 73.0 %    Lymph % 21.6 18.0 - 48.0 %    Mono % 6.8 4.0 - 15.0 %    Eosinophil % 1.0 0.0 - 8.0 %    Basophil % 0.4 0.0 - 1.9 %    Differential Method Automated    Comprehensive metabolic panel    Collection Time: 06/17/23  2:52 PM   Result Value Ref Range    Sodium 135 (L) 136 - 145 mmol/L    Potassium 3.7 3.5 - 5.1 mmol/L    Chloride 105 95 - 110 mmol/L    CO2 21 (L) 23 - 29 mmol/L    Glucose 123 (H) 70 - 110 mg/dL    BUN 9 6 - 20 mg/dL    Creatinine 0.7 0.5 - 1.4 mg/dL    Calcium 9.5 8.7 - 10.5 mg/dL    Total Protein 7.3 6.0 - 8.4 g/dL    Albumin 3.7 3.5 - 5.2 g/dL    Total Bilirubin 0.2 0.1 - 1.0 mg/dL    Alkaline Phosphatase 82 55 - 135 U/L    AST 15 10 - 40 U/L    ALT 18 10 - 44 U/L    Anion Gap 9 8 - 16 mmol/L    eGFR >60 >60 mL/min/1.73 m^2   hCG, quantitative, pregnancy    Collection Time: 06/17/23  2:52 PM   Result Value Ref Range    HCG Quant 53821 See Text mIU/mL   POCT urine pregnancy    Collection Time: 06/17/23  3:05 PM   Result Value Ref Range    POC Preg Test, Ur Positive (A) Negative     Acceptable Yes          Physical Exam:  GEN: No apparent distress. Alert and oriented.   CV: Regular rate  and rhythm. Extremities warm and well perfused.   LUNGS: Breathing comfortably on RA, no increased WOB.   ABDOMEN: Soft, non tender, non-distended. No guarding or rebound tenderness.  EXT: No erythema, no edema  : declined       Diagnostics:   Imaging Results               US OB <14 Wks TransAbd & TransVag, Single Gestation (XPD) (Final result)  Result time 23 17:04:15      Final result by Migel Ramirez MD (23 17:04:15)                   Impression:      Single intrauterine gestational sac containing a yolk sac and fetal pole with estimated age of 9 weeks and 4 days.  No fetal cardiac activity detected which should be present at this point; therefore, highly concerning for early pregnancy failure/missed miscarriage.    This report was flagged in Epic as abnormal.      Electronically signed by: Migel Ramirez MD  Date:    2023  Time:    17:04               Narrative:    EXAMINATION:  US OB <14 WEEKS, TRANSABDOM & TRANSVAG, SINGLE GESTATION (XPD)    CLINICAL HISTORY:  abdominal cramping;    TECHNIQUE:  Transabdominal sonography of the pelvis was performed, followed by transvaginal sonography to better evaluate the uterus and ovaries.    COMPARISON:  None.    FINDINGS:  Uterus: 8.7 x 5.4 x 8.4 cm.  No discrete fibroid seen.    Intrauterine gestation(s): Single    Mean gestational sac diameter: 4.1 cm    Yolk sac: Present    Crown-rump length (CRL): 2.7 cm    Cardiac activity: None detected.    Subchorionic hemorrhage: None.    Right ovary: Not visualized.    Left ovary: Within normal limits.    Miscellaneous: No Free Fluid.                                         ASSESSMENT/PLAN:     Active Hospital Problems    Diagnosis  POA    *Missed  [O02.1]  Unknown      Resolved Hospital Problems   No resolved problems to display.       Inessa Salgado is a 22 y.o. estimated 6wk GA by LMP, 9wk GA by TVUS who presents with missed ab.      Missed ab   - VSS   - Exam without signs of infection or  hemorrhage; no abdominal tenderness   - WBC 11   - TVUS with missed , estimated 9wk GA   - T&S pending; plan for Rhogam if Rh negative, discussed with ED provider   - Discussed diagnosis and management options, including expectant medication and surgical (D&C and MVA). Patient opted for medication management.     Plan:  - Discussed intravaginal Cytotec 800mcg PV up to 2 doses 3H apart. Discharged with Tylenol/Ibuprofen and Zofran.   - Discussed expectations regarding bleeding and cramping; return precautions for signs of infection and hemorrhage  - F/u in clinic within 1 week; resident family planning clinic messaged   - Patient desires pregnancy. Will discuss pre pregnancy recommendations at clinic visit.     Discussed plan with [facility] and staff who was in agreement.    Justa Pelayo MD  PGY 2  Obstetrics and Gynecology

## 2023-06-19 ENCOUNTER — TELEPHONE (OUTPATIENT)
Dept: OBSTETRICS AND GYNECOLOGY | Facility: CLINIC | Age: 23
End: 2023-06-19
Payer: MEDICAID

## 2023-06-19 ENCOUNTER — HOSPITAL ENCOUNTER (OUTPATIENT)
Facility: OTHER | Age: 23
Discharge: HOME | End: 2023-06-20
Attending: EMERGENCY MEDICINE | Admitting: STUDENT IN AN ORGANIZED HEALTH CARE EDUCATION/TRAINING PROGRAM
Payer: MEDICAID

## 2023-06-19 DIAGNOSIS — O02.1 MISSED ABORTION: Primary | ICD-10-CM

## 2023-06-19 DIAGNOSIS — F41.9 ANXIETY: ICD-10-CM

## 2023-06-19 DIAGNOSIS — O46.90 VAGINAL BLEEDING IN PREGNANCY: ICD-10-CM

## 2023-06-19 DIAGNOSIS — O03.9 SAB (SPONTANEOUS ABORTION): ICD-10-CM

## 2023-06-19 DIAGNOSIS — O03.9 MISCARRIAGE: ICD-10-CM

## 2023-06-19 DIAGNOSIS — M54.50 ACUTE BILATERAL LOW BACK PAIN WITHOUT SCIATICA: ICD-10-CM

## 2023-06-19 DIAGNOSIS — N93.9 VAGINAL HEMORRHAGE: ICD-10-CM

## 2023-06-19 DIAGNOSIS — R10.2 PELVIC PAIN: ICD-10-CM

## 2023-06-19 DIAGNOSIS — O03.4 RETAINED PRODUCTS OF CONCEPTION AFTER MISCARRIAGE: ICD-10-CM

## 2023-06-19 LAB
BACTERIA #/AREA URNS HPF: ABNORMAL /HPF
BASOPHILS # BLD AUTO: 0.04 K/UL (ref 0–0.2)
BASOPHILS NFR BLD: 0.4 % (ref 0–1.9)
BILIRUB UR QL STRIP: NEGATIVE
CLARITY UR: CLEAR
COLOR UR: YELLOW
DIFFERENTIAL METHOD: ABNORMAL
EOSINOPHIL # BLD AUTO: 0.3 K/UL (ref 0–0.5)
EOSINOPHIL NFR BLD: 3.3 % (ref 0–8)
ERYTHROCYTE [DISTWIDTH] IN BLOOD BY AUTOMATED COUNT: 13.2 % (ref 11.5–14.5)
GLUCOSE UR QL STRIP: NEGATIVE
HCT VFR BLD AUTO: 27.5 % (ref 37–48.5)
HCV AB SERPL QL IA: NEGATIVE
HGB BLD-MCNC: 9.2 G/DL (ref 12–16)
HGB UR QL STRIP: ABNORMAL
HIV 1+2 AB+HIV1 P24 AG SERPL QL IA: NEGATIVE
HYALINE CASTS #/AREA URNS LPF: 0 /LPF
IMM GRANULOCYTES # BLD AUTO: 0.06 K/UL (ref 0–0.04)
IMM GRANULOCYTES NFR BLD AUTO: 0.6 % (ref 0–0.5)
KETONES UR QL STRIP: ABNORMAL
LEUKOCYTE ESTERASE UR QL STRIP: NEGATIVE
LYMPHOCYTES # BLD AUTO: 2.7 K/UL (ref 1–4.8)
LYMPHOCYTES NFR BLD: 27.2 % (ref 18–48)
MCH RBC QN AUTO: 28.9 PG (ref 27–31)
MCHC RBC AUTO-ENTMCNC: 33.5 G/DL (ref 32–36)
MCV RBC AUTO: 87 FL (ref 82–98)
MICROSCOPIC COMMENT: ABNORMAL
MONOCYTES # BLD AUTO: 0.8 K/UL (ref 0.3–1)
MONOCYTES NFR BLD: 8.1 % (ref 4–15)
NEUTROPHILS # BLD AUTO: 6.1 K/UL (ref 1.8–7.7)
NEUTROPHILS NFR BLD: 60.4 % (ref 38–73)
NITRITE UR QL STRIP: NEGATIVE
NRBC BLD-RTO: 0 /100 WBC
PH UR STRIP: 7 [PH] (ref 5–8)
PLATELET # BLD AUTO: 197 K/UL (ref 150–450)
PMV BLD AUTO: 9.2 FL (ref 9.2–12.9)
PROT UR QL STRIP: ABNORMAL
RBC # BLD AUTO: 3.18 M/UL (ref 4–5.4)
RBC #/AREA URNS HPF: >100 /HPF (ref 0–4)
SP GR UR STRIP: 1.01 (ref 1–1.03)
URN SPEC COLLECT METH UR: ABNORMAL
UROBILINOGEN UR STRIP-ACNC: NEGATIVE EU/DL
WBC # BLD AUTO: 10.01 K/UL (ref 3.9–12.7)
WBC #/AREA URNS HPF: 2 /HPF (ref 0–5)

## 2023-06-19 PROCEDURE — 25000003 PHARM REV CODE 250: Performed by: EMERGENCY MEDICINE

## 2023-06-19 PROCEDURE — 99285 EMERGENCY DEPT VISIT HI MDM: CPT

## 2023-06-19 PROCEDURE — 99213 PR OFFICE/OUTPT VISIT, EST, LEVL III, 20-29 MIN: ICD-10-PCS | Mod: TH,,, | Performed by: STUDENT IN AN ORGANIZED HEALTH CARE EDUCATION/TRAINING PROGRAM

## 2023-06-19 PROCEDURE — 96375 TX/PRO/DX INJ NEW DRUG ADDON: CPT

## 2023-06-19 PROCEDURE — 85025 COMPLETE CBC W/AUTO DIFF WBC: CPT | Mod: 91 | Performed by: GENERAL PRACTICE

## 2023-06-19 PROCEDURE — 63600175 PHARM REV CODE 636 W HCPCS: Performed by: EMERGENCY MEDICINE

## 2023-06-19 PROCEDURE — 87389 HIV-1 AG W/HIV-1&-2 AB AG IA: CPT | Performed by: EMERGENCY MEDICINE

## 2023-06-19 PROCEDURE — 63600175 PHARM REV CODE 636 W HCPCS: Performed by: GENERAL PRACTICE

## 2023-06-19 PROCEDURE — G0378 HOSPITAL OBSERVATION PER HR: HCPCS

## 2023-06-19 PROCEDURE — 86803 HEPATITIS C AB TEST: CPT | Performed by: EMERGENCY MEDICINE

## 2023-06-19 PROCEDURE — 99213 OFFICE O/P EST LOW 20 MIN: CPT | Mod: TH,,, | Performed by: STUDENT IN AN ORGANIZED HEALTH CARE EDUCATION/TRAINING PROGRAM

## 2023-06-19 PROCEDURE — 88305 TISSUE EXAM BY PATHOLOGIST: CPT | Performed by: PATHOLOGY

## 2023-06-19 PROCEDURE — 81000 URINALYSIS NONAUTO W/SCOPE: CPT | Performed by: EMERGENCY MEDICINE

## 2023-06-19 PROCEDURE — 25000003 PHARM REV CODE 250: Performed by: GENERAL PRACTICE

## 2023-06-19 PROCEDURE — 88305 TISSUE EXAM BY PATHOLOGIST: CPT | Mod: 26,,, | Performed by: PATHOLOGY

## 2023-06-19 PROCEDURE — 96374 THER/PROPH/DIAG INJ IV PUSH: CPT

## 2023-06-19 PROCEDURE — 88305 TISSUE EXAM BY PATHOLOGIST: ICD-10-PCS | Mod: 26,,, | Performed by: PATHOLOGY

## 2023-06-19 PROCEDURE — 96361 HYDRATE IV INFUSION ADD-ON: CPT

## 2023-06-19 RX ORDER — PROCHLORPERAZINE EDISYLATE 5 MG/ML
5 INJECTION INTRAMUSCULAR; INTRAVENOUS EVERY 6 HOURS PRN
Status: DISCONTINUED | OUTPATIENT
Start: 2023-06-19 | End: 2023-06-20 | Stop reason: HOSPADM

## 2023-06-19 RX ORDER — IBUPROFEN 400 MG/1
400 TABLET ORAL EVERY 6 HOURS
Status: DISCONTINUED | OUTPATIENT
Start: 2023-06-19 | End: 2023-06-20 | Stop reason: HOSPADM

## 2023-06-19 RX ORDER — ACETAMINOPHEN 325 MG/1
650 TABLET ORAL
Status: DISCONTINUED | OUTPATIENT
Start: 2023-06-19 | End: 2023-06-20 | Stop reason: HOSPADM

## 2023-06-19 RX ORDER — HYDROMORPHONE HYDROCHLORIDE 1 MG/ML
0.5 INJECTION, SOLUTION INTRAMUSCULAR; INTRAVENOUS; SUBCUTANEOUS EVERY 6 HOURS PRN
Status: DISCONTINUED | OUTPATIENT
Start: 2023-06-19 | End: 2023-06-20 | Stop reason: HOSPADM

## 2023-06-19 RX ORDER — OXYCODONE HYDROCHLORIDE 5 MG/1
5 TABLET ORAL EVERY 6 HOURS PRN
Status: DISCONTINUED | OUTPATIENT
Start: 2023-06-19 | End: 2023-06-20 | Stop reason: HOSPADM

## 2023-06-19 RX ORDER — OXYCODONE HYDROCHLORIDE 5 MG/1
10 TABLET ORAL EVERY 6 HOURS PRN
Status: DISCONTINUED | OUTPATIENT
Start: 2023-06-19 | End: 2023-06-20 | Stop reason: HOSPADM

## 2023-06-19 RX ORDER — ONDANSETRON 8 MG/1
8 TABLET, ORALLY DISINTEGRATING ORAL EVERY 8 HOURS PRN
Status: DISCONTINUED | OUTPATIENT
Start: 2023-06-19 | End: 2023-06-20 | Stop reason: HOSPADM

## 2023-06-19 RX ORDER — FENTANYL CITRATE 50 UG/ML
50 INJECTION, SOLUTION INTRAMUSCULAR; INTRAVENOUS
Status: COMPLETED | OUTPATIENT
Start: 2023-06-19 | End: 2023-06-19

## 2023-06-19 RX ORDER — SODIUM CHLORIDE 0.9 % (FLUSH) 0.9 %
10 SYRINGE (ML) INJECTION
Status: DISCONTINUED | OUTPATIENT
Start: 2023-06-19 | End: 2023-06-20 | Stop reason: HOSPADM

## 2023-06-19 RX ADMIN — IBUPROFEN 400 MG: 400 TABLET ORAL at 06:06

## 2023-06-19 RX ADMIN — ACETAMINOPHEN 650 MG: 325 TABLET, FILM COATED ORAL at 03:06

## 2023-06-19 RX ADMIN — ACETAMINOPHEN 650 MG: 325 TABLET, FILM COATED ORAL at 08:06

## 2023-06-19 RX ADMIN — ACETAMINOPHEN 650 MG: 325 TABLET, FILM COATED ORAL at 10:06

## 2023-06-19 RX ADMIN — IBUPROFEN 400 MG: 400 TABLET ORAL at 01:06

## 2023-06-19 RX ADMIN — ONDANSETRON 8 MG: 8 TABLET, ORALLY DISINTEGRATING ORAL at 04:06

## 2023-06-19 RX ADMIN — FENTANYL CITRATE 50 MCG: 50 INJECTION, SOLUTION INTRAMUSCULAR; INTRAVENOUS at 05:06

## 2023-06-19 RX ADMIN — PROCHLORPERAZINE EDISYLATE 5 MG: 5 INJECTION INTRAMUSCULAR; INTRAVENOUS at 05:06

## 2023-06-19 RX ADMIN — SODIUM CHLORIDE 1000 ML: 9 INJECTION, SOLUTION INTRAVENOUS at 06:06

## 2023-06-19 NOTE — TELEPHONE ENCOUNTER
----- Message from Carmela Peña MD sent at 6/18/2023  7:16 PM CDT -----  Minna Irwin,    This patient came into the [facility] ED for an SAB. Can we have her scheduled for just an ED follow up? Not sure if this should go to family planning clinic or regular resident clinic but she wants to conceive! This was a desired pregnancy. She has not established care with an OB yet.    Thanks    Carmela

## 2023-06-19 NOTE — ED PROVIDER NOTES
Source of History:  Patient    Chief complaint:  Fatigue (Took a + pregnancy test in the middle of may, unable to give specific date. Pt is unsure how far along she is. Denies bleeding. Reports feeling fatigued with N/V and increased thirst. Denies other symptoms. Requesting ultrasound and HCG level. Hasn't been able to F/U with obgyn yet. )      HPI:  Inessa Salgado is a 22 y.o. female  presenting with c/o lightheadedness x 1 day. Patient reports that her LMP was at the beginning of April, and she had a positive pregnancy test in the middle of May. Has not been scheduled for an initial prenatal appointment yet. Reports yesterday she felt overall fatigued. Today continued to feel bad, causing her to present here. She denies any fever, chills, nausea, vaginal bleeding, diarrhea. She reports one episode of vomiting earlier today. She states she has IBS and has abdominal cramping due to that, notes that she has had abdominal cramping but is unsure if it is her normal IBS cramping or not. Denies any vaginal discharge. States this is pregnancy was not planned but is a wanted pregnancy. Mom present with daughter, they are very concerned about the baby being okay since they have not had an initial prenatal visit.    This is the extent to the patients complaints today here in the emergency department.    PMH:  As per HPI and below:  Past Medical History:   Diagnosis Date    Seizures     febrile seizure     Past Surgical History:   Procedure Laterality Date    ESOPHAGOGASTRODUODENOSCOPY  2022    ESOPHAGOGASTRODUODENOSCOPY N/A 11/3/2022    Procedure: EGD (ESOPHAGOGASTRODUODENOSCOPY);  Surgeon: Ronnie Garcia MD;  Location: [facility];  Service: Endoscopy;  Laterality: N/A;       Social History     Tobacco Use    Smoking status: Never    Smokeless tobacco: Never   Substance Use Topics    Alcohol use: Yes     Comment: occasionally    Drug use: Never     Review of patient's allergies indicates:   Allergen  "Reactions    Augmentin [amoxicillin-pot clavulanate] Hives    Morphine     Suprax [cefixime]        ROS: As per HPI and below:  Constitutional: No fever.  No chills. + generalized weakness/fatigue  Eyes: No visual changes.  ENT: No sore throat. No ear pain    Cardiovascular: No chest pain.  Respiratory: No shortness of breath.  GI: + vomiting, abdominal cramping  Genitourinary: no vaginal bleeding  Neurologic: No headache. No focal weakness.  No numbness.  MSK: no back pain   Integument: No rashes or lesions.    Physical Exam:    BP (!) 109/58 (BP Location: Right arm, Patient Position: Sitting)   Pulse 75   Temp 98.4 °F (36.9 °C) (Oral)   Resp 18   Ht 5' 1" (1.549 m)   Wt 57.2 kg (126 lb)   SpO2 99%   BMI 23.81 kg/m²   Vitals:    23 1434 23 1702 23 1903 23 2026   BP: (!) 109/59 (!) 101/58 (!) 101/58 (!) 109/58   Pulse: 93 90 85 75   Resp: 20  16 18   Temp: 98.4 °F (36.9 °C)  98.4 °F (36.9 °C)    TempSrc: Oral  Oral    SpO2: 98% 100% 98% 99%   Weight: 57.2 kg (126 lb)      Height: 5' 1" (1.549 m)          Nurse's notes vitals reviewed  Constitutional: Patient alert and oriented well-developed well-nourished  Eyes:  Normal conjunctiva.  Extraocular muscles are intact.  ENT: Oral mucosa moist  Chest: Lungs clear to auscultation bilaterally, no wheezing, rales, rhonchi.  CV: Heart regular rate and rhythm with no murmur, rub, gallop.  GI: Abdomen soft, no tenderness, no distension, no masses, rebound, guarding.   Musculoskeletal: Normocephalic atraumatic, normal range of motion, no obvious deformities  Skin: Warm and dry no rashes or lesions, no ecchymosis, no erythema  Neuro: alert and oriented x3,  no focal neurological deficits.  Psych: Appropriate, conversant    Initial MDM:  22 year old  female presenting for evaluation of lightheadedness and generalized fatigue x 1 day. No vaginal bleeding, some abdominal cramping, one episode of vomiting today. Vitals stable, afebrile. Will " obtain labs, UA.  Given that we cannot determine if abdominal cramping is chronic from IBS versus pregnancy related, and given that patient has not had initial u/s, we will also obtain u/s today.    Labs Reviewed   CBC W/ AUTO DIFFERENTIAL - Abnormal; Notable for the following components:       Result Value    MPV 9.0 (*)     All other components within normal limits    Narrative:     Release to patient->Immediate   COMPREHENSIVE METABOLIC PANEL - Abnormal; Notable for the following components:    Sodium 135 (*)     CO2 21 (*)     Glucose 123 (*)     All other components within normal limits    Narrative:     Release to patient->Immediate   POCT URINE PREGNANCY - Abnormal; Notable for the following components:    POC Preg Test, Ur Positive (*)     All other components within normal limits   HCG, QUANTITATIVE    Narrative:     Release to patient->Immediate   GROUP & RH       US OB <14 Wks TransAbd & TransVag, Single Gestation (XPD)   Final Result   Abnormal      Single intrauterine gestational sac containing a yolk sac and fetal pole with estimated age of 9 weeks and 4 days.  No fetal cardiac activity detected which should be present at this point; therefore, highly concerning for early pregnancy failure/missed miscarriage.      This report was flagged in Epic as abnormal.         Electronically signed by: Migel Ramirez MD   Date:    06/17/2023   Time:    17:04            Initial Impression/ Differential Dx:  Differential Diagnosis includes, but is not limited to:  Pregnancy complication (threatened AB, inevitable AB, incomplete Ab, missed AB, uterine rupture, ectopic pregnancy, placental abruption, placenta previa), ovarian cyst/torsion, STD, viral illness, generalized fatigue during pregnancy    MDM:      ED Course as of 06/19/23 1116   Sat Jun 17, 2023   1507 Preg Test, Ur(!): Positive [AS]   1553 HCG Quant: [id number] [SW]   1553 CMP unremarkable.  Sodium 135, we will give fluids. [SW]   1554 CBC within normal limits,  no leukocytosis, no anemia. [SW]   1727 Ultrasound with results of an estimated 9 week 4 day GA IUP with no fetal cardiac activity detected, concerning for missed . Spoke with OB, they advised that I counseled patient on her options and let them know if she would like any intervention today.  If she elects intervention, they will come down and perform an exam. They will also get her scheduled for follow up. [SW]   1755 I informed patient of the results of ultrasound and educated her on possible next steps.  Patient would like to speak with OB about Cytotec treatment today.  Called OB, they will come down and evaluate patient. [SW]   194 Group & Rh: A POS  No need for rhogam.  OB prescribed cytotec for patient, gave her instructions for use and return precautions. Patient had all her questions answered and discharged home with cytotec and follow up with OB. Patient verbalized understanding of all instructions. I discussed this case with my attending, Dr. Herbert, who was in agreement with plan.  [SW]      ED Course User Index  [AS] CHARLI Douglas  [SW] Regina Moe PA-C       Diagnostic Impression:    1. Missed          ED Disposition Condition    Discharge Stable            ED Prescriptions       Medication Sig Dispense Start Date End Date Auth. Provider    miSOPROStoL (CYTOTEC) 200 MCG Tab Place 4 tablets (800 mcg total) vaginally every 3 (three) hours as needed (Repeat dose after 3 hours if no cramping or vaginal bleeding occurs). If you do not have bleeding or cramping after 3 hours, repeat the dose and place 800mcg or 4 tablets intravaginally. 8 tablet 2023 -- Pretty Pelayo MD    ondansetron (ZOFRAN-ODT) 4 MG TbDL Take 1 tablet (4 mg total) by mouth every 6 (six) hours as needed (Nausea and vomiting). 30 tablet 2023 -- Pretty Pelayo MD    acetaminophen (TYLENOL) 325 MG tablet Take 2 tablets (650 mg total) by mouth every 6 (six) hours. Alternate between ibuprofen and  tylenol every 3 hours. For example: @0800: ibuprofen 600mg @1100: tylenol 650mg @1400: ibuprofen 600mg @1700: tylenol 650 mg @2000: ibuprofen 600mg for 7 days 56 tablet 2023 Pretty Pelayo MD    ibuprofen (ADVIL,MOTRIN) 600 MG tablet () Take 1 tablet (600 mg total) by mouth every 6 (six) hours. Alternate between ibuprofen and tylenol every 3 hours. For example: @0800: ibuprofen 600mg @1100: tylenol 650mg @1400: ibuprofen 600mg @1700: tylenol 650 mg @2000: ibuprofen 600mg for 7 days 28 tablet 2023 Pretty Pelayo MD          Follow-up Information       Follow up With Specialties Details Why Contact Info Additional Information    [facility] - [facility] GYN Obstetrics and Gynecology Schedule an appointment as soon as possible for a visit in 1 week  [address] [facility] [address] [phone] OB GYN - [facility], 4th Floor, [address] Please park in [place] and use [place] elevators    [facility] - Emergency Dept Emergency Medicine Go to  If symptoms worsen [address] [facility] [address] [phone]              Regina Moe PA-C  [phone]

## 2023-06-19 NOTE — DISCHARGE INSTRUCTIONS
No tampons or douching or sex until cleared by your obstetrician.  Return to the ER immediately if you experience heavy bleeding (more than 1 pad per hour for 3 hours in a row), severe pain, fever, or other concerns.

## 2023-06-19 NOTE — H&P
H&P  Gynecology      SUBJECTIVE:     History of Present Illness:  Inessa Salgado is a 22 y.o.  who presented to the [facility] ED as a transfer due to persistent abdominal and pelvic pain and heavy vaginal bleeding in setting of SAB. Pt was admitted to the Gyn service for monitoring and pain control.    Pt had presented to the ED on , 2 days ago, and was diagnosed with MAB and desired medical management with cytotec. Pt placed the cytotec yesterday and began having painful cramping and heavy bleeding in the early afternoon for which she presented to the [facility] ED yesterday evening. Her TVUS was negative with no gestational sac seen, H/H was stable, VSS and WNL, and she was discharged home in stable condition. She then presented to the [facility] ED after being discharged from [facility] due to continued heavy vaginal bleeding with large clots. Her H/H had decreased from 10/33>8/ for which 1upRBC was transfused. She was transferred back to [facility] for evaluation by gynecology.    On arrival, pt reports continued pain and bleeding unrelieved with any medications given. Pt states she is very anxious about the amount of bleeding she is having and she feels like something isn't right. Denies fever, chills, N/V/D, syncope, LOC. Reports she was feeling weak and anxious when she presented to the [facility] ED but is now feeling better s/p her transfusion. Denies headaches, vision changes, dysuria, hematuria, back pain.  Patient reports allergy to morphine, but is unsure of reaction. Has received fentanyl on arrival.            Review of patient's allergies indicates:   Allergen Reactions    Augmentin [amoxicillin-pot clavulanate] Hives    Morphine     Suprax [cefixime]        Past Medical History:   Diagnosis Date    Seizures     febrile seizure     Past Surgical History:   Procedure Laterality Date    ESOPHAGOGASTRODUODENOSCOPY  2022    ESOPHAGOGASTRODUODENOSCOPY N/A 11/3/2022    Procedure: EGD  (ESOPHAGOGASTRODUODENOSCOPY);  Surgeon: Ronnie Garcia MD;  Location: [facility];  Service: Endoscopy;  Laterality: N/A;     OB History    No obstetric history on file.       Family History   Problem Relation Age of Onset    Heart disease Mother     Hyperlipidemia Mother     Hypertension Father     Hyperlipidemia Father      Social History     Tobacco Use    Smoking status: Never    Smokeless tobacco: Never   Substance Use Topics    Alcohol use: Yes     Comment: occasionally    Drug use: Never       Current Facility-Administered Medications   Medication    acetaminophen tablet 650 mg    HYDROmorphone injection 0.5 mg    ibuprofen tablet 400 mg    ondansetron disintegrating tablet 8 mg    oxyCODONE immediate release tablet 10 mg    oxyCODONE immediate release tablet 5 mg    prochlorperazine injection Soln 5 mg    sodium chloride 0.9% flush 10 mL     Current Outpatient Medications   Medication Sig    acetaminophen (TYLENOL) 325 MG tablet Take 2 tablets (650 mg total) by mouth every 6 (six) hours. Alternate between ibuprofen and tylenol every 3 hours. For example: @0800: ibuprofen 600mg @1100: tylenol 650mg @1400: ibuprofen 600mg @1700: tylenol 650 mg @2000: ibuprofen 600mg for 7 days    BALCOLTRA 0.1 mg-0.02 mg (21)/36.5 mg(7) Tab Take 1 tablet by mouth once daily.    benzoyl peroxide 5 % external liquid WASH FACE EVERY DAY AS DIRECTED    HYDROcodone-acetaminophen (NORCO) 5-325 mg per tablet Take 1 tablet by mouth every 4 (four) hours as needed for Pain.    ibuprofen (ADVIL,MOTRIN) 800 MG tablet Take 1 tablet (800 mg total) by mouth every 6 (six) hours as needed for Pain.    miSOPROStoL (CYTOTEC) 200 MCG Tab Place 4 tablets (800 mcg total) vaginally every 3 (three) hours as needed (Repeat dose after 3 hours if no cramping or vaginal bleeding occurs). If you do not have bleeding or cramping after 3 hours, repeat the dose and place 800mcg or 4 tablets intravaginally.    omeprazole (PRILOSEC) 40 MG capsule Take  1 capsule (40 mg total) by mouth once daily.    ondansetron (ZOFRAN-ODT) 4 MG TbDL Take 1 tablet (4 mg total) by mouth every 6 (six) hours as needed (Nausea and vomiting).     Facility-Administered Medications Ordered in Other Encounters   Medication    0.9%  NaCl infusion    LIDOcaine (PF) 10 mg/ml (1%) injection 10 mg       Review of Systems:  Constitutional: no significant weight change, fever, fatigue  Eyes:  No vision changes  Cardiovascular: No chest pain  Respiratory: No shortness of breath or cough  Gastrointestinal: No diarrhea, bloody stool, nausea/vomiting, constipation  Genitourinary: + heavy vaginal bleeding with clots, + abd/pelvic pain, No blood in urine, painful urination, urgency of urination, frequency of urination  Skin/Breast: No painful breasts, nipple discharge, masses  Neurological: No headache  Endocrine: No hot flushes  Psychiatric: No depression, + anxiety     OBJECTIVE:     Vital Signs  Temp:  [97.9 °F (36.6 °C)-98.9 °F (37.2 °C)] 98.2 °F (36.8 °C)  Pulse:  [] 79  Resp:  [14-21] 14  SpO2:  [97 %-100 %] 99 %  BP: ()/(50-92) 96/55    Physical Exam:  Gen: A&Ox3, NAD  CV: Regular rate  Pulm: normal work of breathing  Abd: Soft, non-distended, minimally-tender to palpation without rebound or guarding, no peritoneal signs  Ext: PPP, no peripheral edema  External genitalia: WNL  Urethra and Meatus: WNL  Vagina: Moist, pink, normal ruggae, -discharge  Cervix: WNL, no CMT  Uterus:  mobile, fundus non-TTP  Adnexa: No masses, no TTP      exam:  SSE: ~200 cc dark red clots evacuating from vaginal vault  Gestational sac visualized protruding through external os, removed with ring forceps (sent to pathology), specimen ~ 3cm long in size  Minimal oozing from external os visualized after products removed, no active bleeding noted, no trailing membranes visualized  Bimanual exam: limited due to patient discomfort, no further clots expressed with fundal massage, no bright red bleeding      Laboratory  Recent Results (from the past 96 hour(s))   CBC auto differential    Collection Time: 06/17/23  2:52 PM   Result Value Ref Range    WBC 10.53 3.90 - 12.70 K/uL    RBC 4.54 4.00 - 5.40 M/uL    Hemoglobin 12.5 12.0 - 16.0 g/dL    Hematocrit 38.9 37.0 - 48.5 %    MCV 86 82 - 98 fL    MCH 27.5 27.0 - 31.0 pg    MCHC 32.1 32.0 - 36.0 g/dL    RDW 13.0 11.5 - 14.5 %    Platelets 295 150 - 450 K/uL    MPV 9.0 (L) 9.2 - 12.9 fL    Immature Granulocytes 0.4 0.0 - 0.5 %    Gran # (ANC) 7.4 1.8 - 7.7 K/uL    Immature Grans (Abs) 0.04 0.00 - 0.04 K/uL    Lymph # 2.3 1.0 - 4.8 K/uL    Mono # 0.7 0.3 - 1.0 K/uL    Eos # 0.1 0.0 - 0.5 K/uL    Baso # 0.04 0.00 - 0.20 K/uL    nRBC 0 0 /100 WBC    Gran % 69.8 38.0 - 73.0 %    Lymph % 21.6 18.0 - 48.0 %    Mono % 6.8 4.0 - 15.0 %    Eosinophil % 1.0 0.0 - 8.0 %    Basophil % 0.4 0.0 - 1.9 %    Differential Method Automated    Comprehensive metabolic panel    Collection Time: 06/17/23  2:52 PM   Result Value Ref Range    Sodium 135 (L) 136 - 145 mmol/L    Potassium 3.7 3.5 - 5.1 mmol/L    Chloride 105 95 - 110 mmol/L    CO2 21 (L) 23 - 29 mmol/L    Glucose 123 (H) 70 - 110 mg/dL    BUN 9 6 - 20 mg/dL    Creatinine 0.7 0.5 - 1.4 mg/dL    Calcium 9.5 8.7 - 10.5 mg/dL    Total Protein 7.3 6.0 - 8.4 g/dL    Albumin 3.7 3.5 - 5.2 g/dL    Total Bilirubin 0.2 0.1 - 1.0 mg/dL    Alkaline Phosphatase 82 55 - 135 U/L    AST 15 10 - 40 U/L    ALT 18 10 - 44 U/L    Anion Gap 9 8 - 16 mmol/L    eGFR >60 >60 mL/min/1.73 m^2   hCG, quantitative, pregnancy    Collection Time: 06/17/23  2:52 PM   Result Value Ref Range    HCG Quant 85617 See Text mIU/mL   POCT urine pregnancy    Collection Time: 06/17/23  3:05 PM   Result Value Ref Range    POC Preg Test, Ur Positive (A) Negative     Acceptable Yes    ABO/Rh    Collection Time: 06/17/23  7:08 PM   Result Value Ref Range    Group & Rh A POS    CBC auto differential    Collection Time: 06/18/23  5:33 PM   Result Value Ref  Range    WBC 15.50 (H) 3.90 - 12.70 K/uL    RBC 4.12 4.00 - 5.40 M/uL    Hemoglobin 11.2 (L) 12.0 - 16.0 g/dL    Hematocrit 35.0 (L) 37.0 - 48.5 %    MCV 85 82 - 98 fL    MCH 27.2 27.0 - 31.0 pg    MCHC 32.0 32.0 - 36.0 g/dL    RDW 13.2 11.5 - 14.5 %    Platelets 310 150 - 450 K/uL    MPV 10.0 9.2 - 12.9 fL    Immature Granulocytes 0.5 0.0 - 0.5 %    Gran # (ANC) 11.6 (H) 1.8 - 7.7 K/uL    Immature Grans (Abs) 0.07 (H) 0.00 - 0.04 K/uL    Lymph # 2.7 1.0 - 4.8 K/uL    Mono # 1.1 (H) 0.3 - 1.0 K/uL    Eos # 0.1 0.0 - 0.5 K/uL    Baso # 0.03 0.00 - 0.20 K/uL    nRBC 0 0 /100 WBC    Gran % 74.5 (H) 38.0 - 73.0 %    Lymph % 17.4 (L) 18.0 - 48.0 %    Mono % 6.9 4.0 - 15.0 %    Eosinophil % 0.5 0.0 - 8.0 %    Basophil % 0.2 0.0 - 1.9 %    Differential Method Automated    Comprehensive metabolic panel    Collection Time: 06/18/23  7:58 PM   Result Value Ref Range    Sodium 134 (L) 136 - 145 mmol/L    Potassium 3.9 3.5 - 5.1 mmol/L    Chloride 107 95 - 110 mmol/L    CO2 18 (L) 23 - 29 mmol/L    Glucose 101 70 - 110 mg/dL    BUN 8 6 - 20 mg/dL    Creatinine 0.6 0.5 - 1.4 mg/dL    Calcium 8.2 (L) 8.7 - 10.5 mg/dL    Total Protein 5.9 (L) 6.0 - 8.4 g/dL    Albumin 3.0 (L) 3.5 - 5.2 g/dL    Total Bilirubin 0.3 0.1 - 1.0 mg/dL    Alkaline Phosphatase 64 55 - 135 U/L    AST 18 10 - 40 U/L    ALT 15 10 - 44 U/L    Anion Gap 9 8 - 16 mmol/L    eGFR >60 >60 mL/min/1.73 m^2   CBC W/ AUTO DIFFERENTIAL    Collection Time: 06/18/23 10:29 PM   Result Value Ref Range    WBC 14.47 (H) 3.90 - 12.70 K/uL    RBC 3.83 (L) 4.00 - 5.40 M/uL    Hemoglobin 10.6 (L) 12.0 - 16.0 g/dL    Hematocrit 33.6 (L) 37.0 - 48.5 %    MCV 88 82 - 98 fL    MCH 27.7 27.0 - 31.0 pg    MCHC 31.5 (L) 32.0 - 36.0 g/dL    RDW 13.0 11.5 - 14.5 %    Platelets 292 150 - 450 K/uL    MPV 9.2 9.2 - 12.9 fL    Immature Granulocytes 0.3 0.0 - 0.5 %    Gran # (ANC) 9.6 (H) 1.8 - 7.7 K/uL    Immature Grans (Abs) 0.05 (H) 0.00 - 0.04 K/uL    Lymph # 3.8 1.0 - 4.8 K/uL    Mono #  0.9 0.3 - 1.0 K/uL    Eos # 0.1 0.0 - 0.5 K/uL    Baso # 0.03 0.00 - 0.20 K/uL    nRBC 0 0 /100 WBC    Gran % 66.3 38.0 - 73.0 %    Lymph % 26.1 18.0 - 48.0 %    Mono % 6.3 4.0 - 15.0 %    Eosinophil % 0.8 0.0 - 8.0 %    Basophil % 0.2 0.0 - 1.9 %    Differential Method Automated    Comp. Metabolic Panel    Collection Time: 06/18/23 10:29 PM   Result Value Ref Range    Sodium 136 136 - 145 mmol/L    Potassium 3.9 3.5 - 5.1 mmol/L    Chloride 106 95 - 110 mmol/L    CO2 21 (L) 23 - 29 mmol/L    Glucose 112 (H) 70 - 110 mg/dL    BUN 9 6 - 20 mg/dL    Creatinine 0.7 0.5 - 1.4 mg/dL    Calcium 8.7 8.7 - 10.5 mg/dL    Total Protein 6.1 6.0 - 8.4 g/dL    Albumin 3.1 (L) 3.5 - 5.2 g/dL    Total Bilirubin 0.2 0.1 - 1.0 mg/dL    Alkaline Phosphatase 65 55 - 135 U/L    AST 17 10 - 40 U/L    ALT 15 10 - 44 U/L    Anion Gap 9 8 - 16 mmol/L    eGFR >60.0 >60 mL/min/1.73 m^2   Type & Screen    Collection Time: 06/18/23 10:29 PM   Result Value Ref Range    Group & Rh A POS     Indirect Jenise NEG     Specimen Outdate 06/21/2023 23:59    Protime-INR    Collection Time: 06/18/23 10:29 PM   Result Value Ref Range    Prothrombin Time 10.3 9.0 - 12.5 sec    INR 1.0 0.8 - 1.2   APTT    Collection Time: 06/18/23 10:29 PM   Result Value Ref Range    aPTT 21.2 21.0 - 32.0 sec   Prepare RBC 2 Units; emergency    Collection Time: 06/18/23 10:29 PM   Result Value Ref Range    UNIT NUMBER [id number]     Product Code X3620I27     DISPENSE STATUS ISSUED     CODING SYSTEM GDDV764     Unit Blood Type Code 6200     Unit Blood Type A POS     Unit Expiration [id number]     CROSSMATCH INTERPRETATION Compatible     UNIT NUMBER [id number]     Product Code D7754G86     DISPENSE STATUS CROSSMATCHED     CODING SYSTEM [id number]     Unit Blood Type Code 6200     Unit Blood Type A POS     Unit Expiration [id number]     CROSSMATCH INTERPRETATION Compatible    CBC auto differential    Collection Time: 06/19/23  1:22 AM   Result Value Ref Range    WBC  11.78 3.90 - 12.70 K/uL    RBC 3.08 (L) 4.00 - 5.40 M/uL    Hemoglobin 8.4 (L) 12.0 - 16.0 g/dL    Hematocrit 26.5 (L) 37.0 - 48.5 %    MCV 86 82 - 98 fL    MCH 27.3 27.0 - 31.0 pg    MCHC 31.7 (L) 32.0 - 36.0 g/dL    RDW 13.1 11.5 - 14.5 %    Platelets 230 150 - 450 K/uL    MPV 9.3 9.2 - 12.9 fL    Immature Granulocytes 0.7 (H) 0.0 - 0.5 %    Gran # (ANC) 9.2 (H) 1.8 - 7.7 K/uL    Immature Grans (Abs) 0.08 (H) 0.00 - 0.04 K/uL    Lymph # 1.8 1.0 - 4.8 K/uL    Mono # 0.6 0.3 - 1.0 K/uL    Eos # 0.1 0.0 - 0.5 K/uL    Baso # 0.02 0.00 - 0.20 K/uL    nRBC 0 0 /100 WBC    Gran % 77.7 (H) 38.0 - 73.0 %    Lymph % 14.9 (L) 18.0 - 48.0 %    Mono % 5.4 4.0 - 15.0 %    Eosinophil % 1.1 0.0 - 8.0 %    Basophil % 0.2 0.0 - 1.9 %    Differential Method Automated        Diagnostic Results:  TVUS   Impression:     Single intrauterine gestational sac containing a yolk sac and fetal pole with estimated age of 9 weeks and 4 days.  No fetal cardiac activity detected which should be present at this point; therefore, highly concerning for early pregnancy failure/missed miscarriage.     TVUS :  FINDINGS:  A 35,262 mIU/mL LMP 2023     Uterus measures 10.6 x 6.2 x 6 cm.  No intrauterine fluid collection/sac.     Is right ovary measures 1.7 x 1.2 x 2.7 cm.  The left ovary was not visualized.     Impression:     No intrauterine gestational sac.     No abnormal adnexal mass.    ASSESSMENT/PLAN:     Active Hospital Problems    Diagnosis  POA    *SAB (spontaneous ) [O03.9]  Unknown      Resolved Hospital Problems   No resolved problems to display.       Inesas Salgado is a 22 y.o. who presents with continued pain and heavy vaginal bleeding in setting of MAB>confirmed SAB. Admitted for pain control and blood loss monitoring    1. SAB  -VSS and WNL  -Physical exam consistent with SAB, products removed and sent to pathology, no active bleeding s/p removal  -Fentanyl given in ED for pain control, pt reports improvement in  pain   -s/p 1u pRBC>repeat CBC ordered 3-4 hours post transfusion   -Admit to GYN service for pain control and monitoring, follow up CBC   -NPO  -Pain control: ibuprofen, tylenol, oxy IR, dilaudid for BTP  -Pt agrees with plan       Carmela Peña MD  OBGYN PGY-2

## 2023-06-19 NOTE — CONSULTS
Please see H&P dated 6/19 for further detail    In brief, pt admitted for pain control, pad counts s/p SAB  Products of conception removed in JOSE  Repeat CBC in 3-4 hours s/p 1uPRBC  Pain control with ibuprofen, oxy, tylenol, dilaudid for BTP    Carmela Peña MD  OBGYN PGY-2

## 2023-06-19 NOTE — ED PROVIDER NOTES
Encounter Date: 6/19/2023       History     Chief Complaint   Patient presents with    Vaginal Bleeding     Transfer from [facility] for vaginal bleeding after complete miscarriage pt arrives receiving 1U RBCs     23 yo female, G1 UPT+, presents via EMS transfer to [facility] with heavy vaginal bleeding.    Patient intially presented here about 2 days ago, 6/17/23, for lightheadedness and vomiting, but no vaginal bleeding.  Ultrasound at that time showed 9w4d IUP but no cardiac activity.  Hgb was 11.2.  The patient was rx'ed vaginal Cytotec, of which she took 800mcg yesterday, around 12:30pm on Sunday 6/18/23.      Patient had increasing abdominal pain after cytotec, and presented back to the ER yesterday afternoon.  She received IV toradol 15mg x 2 and IV dilaudid 0.5mg for pain.  She also received IV NS 1L.  She had a pelvic with removal of POC.  Hgb was 10.6.  Ultrasound showed no IUP, and pain improved with removal of POC.  ER doc discussed with OB/GYN on call, and patient was discharged.      While patient driving home, however, she had back pain and sudden heavy bleeding, so she presented to [facility], where she had another pelvic showing large clot in vaginal canal and bright red blood from cervix.  Hgb at that point was 8.4 so patient was written for transfusion of 1u PRBCs which is currently infusing.  She also had a panic attack at [facility] requiring IV lorazepam 2mg.  Patient also received IV NS 1L.      Patient currently reports feeling weak with low back pain.  She also feels like she is getting a UTI.    [facility] due to pyelonephrits, found to be ESBL E coli, requiring prolonged IV antibiotic therapy via PICC.      Review of patient's allergies indicates:   Allergen Reactions    Augmentin [amoxicillin-pot clavulanate] Hives    Morphine     Suprax [cefixime]      Past Medical History:   Diagnosis Date    Seizures     febrile seizure     Past  Surgical History:   Procedure Laterality Date    ESOPHAGOGASTRODUODENOSCOPY  11/03/2022    ESOPHAGOGASTRODUODENOSCOPY N/A 11/3/2022    Procedure: EGD (ESOPHAGOGASTRODUODENOSCOPY);  Surgeon: Ronnie Garcia MD;  Location: [facility];  Service: Endoscopy;  Laterality: N/A;     Family History   Problem Relation Age of Onset    Heart disease Mother     Hyperlipidemia Mother     Hypertension Father     Hyperlipidemia Father      Social History     Tobacco Use    Smoking status: Never    Smokeless tobacco: Never   Substance Use Topics    Alcohol use: Yes     Comment: occasionally    Drug use: Never     Review of Systems   Constitutional:  Negative for fever.   HENT:  Negative for sore throat.    Eyes:  Negative for photophobia.   Respiratory:  Negative for shortness of breath.    Cardiovascular:  Negative for chest pain.   Gastrointestinal:  Negative for vomiting.   Genitourinary:  Positive for pelvic pain and vaginal bleeding.   Musculoskeletal:  Positive for back pain.   Skin:  Positive for pallor.   Neurological:  Negative for syncope.     Physical Exam     Initial Vitals [06/19/23 0529]   BP Pulse Resp Temp SpO2   -- -- 18 -- --      MAP       --         Physical Exam    Nursing note and vitals reviewed.  Constitutional: She appears well-developed and well-nourished. She is not diaphoretic.   Pale, sleepy adolescent female.   HENT:   Head: Normocephalic and atraumatic.   Dry lips.   Eyes: Conjunctivae and EOM are normal. Pupils are equal, round, and reactive to light.   Neck: Neck supple.   Normal range of motion.  Cardiovascular:  Normal rate, regular rhythm and intact distal pulses.           Pulmonary/Chest: Breath sounds normal. No respiratory distress. She has no wheezes. She has no rhonchi. She has no rales.   Abdominal: Abdomen is soft. There is abdominal tenderness (suprapubic). There is no rebound and no guarding.   Genitourinary:    Genitourinary Comments: Pelvic by Dr. Carmela Peña, OB/GYN resident:  Heavy vaginal bleeding, clots in vaginal vault. Dr. Peña removed POC from cervix using ring forceps and gentle traction.     Musculoskeletal:         General: Tenderness (minimal lumbar) present. No edema. Normal range of motion.      Cervical back: Normal range of motion and neck supple.     Neurological: She is alert and oriented to person, place, and time. She has normal strength.   Moving all extremities.   Skin: Skin is warm and dry. No erythema. There is pallor.   Psychiatric: She has a normal mood and affect.       ED Course   Procedures  Labs Reviewed   URINALYSIS, REFLEX TO URINE CULTURE          Imaging Results    None          Medications   fentaNYL 50 mcg/mL injection 50 mcg (50 mcg Intravenous Given 6/19/23 0529)     Medical Decision Making:   History:   Old Medical Records: I decided to obtain old medical records.  Old Records Summarized: records from previous admission(s).  Initial Assessment:   22 y.o. female, G1, with heavy vaginal bleeding and concern for retained POC.  Differential Diagnosis:   Ddx includes hemorrhage requiring transfusion, retained POC, endometritis, other.  Clinical Tests:   Lab Tests: Ordered and Reviewed  ED Management:  Hgb 8.4, from 11.2 on 6/17/23, 2 days ago. Patient ordered for 1 unit PRBCs at [facility] and is receiving this.      UA ordered and pending as patient suspects she has UTI.      OB/GYN Dr. Peña was able to remove POC from cervix.      Patient received IV fentanyl 50mcg for pain control while in ER.    I discussed case with OB/GYN Dr. Peña.  Plan to OBS to that service.    Maggie Glaeano  5:39 AM    Other:   I have discussed this case with another health care provider.                        Clinical Impression:   Final diagnoses:  [N93.9] Vaginal hemorrhage (Primary)  [O03.9] Miscarriage  [R10.2] Pelvic pain  [M54.50] Acute bilateral low back pain without sciatica  [O46.90] Vaginal bleeding in pregnancy  [O03.4] Retained products of conception  after miscarriage               Maggie Galeano MD  06/19/23 0529

## 2023-06-19 NOTE — CARE UPDATE
Resident to bedside to evaluate patient. Patient reports her bleeding has improved. She has not felt any blood clots or gushes of blood over the last few hours. Her pain is controlled at this time. She reports she still feels weak but denies fever chills, nausea, vomiting, CP, SOB, or palpitations. AM CBC shows rise in H/H after 1u pRBC. Will reassess bleeding and pain control this afternoon then likely discharge.    Ben Arora MD  OBGYN PGY-1

## 2023-06-19 NOTE — ED TRIAGE NOTES
Pt transfer from OSH for continued vaginal bleeding, OB at bedside, removed several large clots approx softball size, pt receiving 1 unit of blood en route, MD Galeano at bedside

## 2023-06-19 NOTE — NURSING
Nurses Note -- 4 Eyes      6/19/2023   3:14 PM      Skin assessed during: Admit      [x] No Altered Skin Integrity Present    []Prevention Measures Documented      [] Yes- Altered Skin Integrity Present or Discovered   [] LDA Added if Not in Epic (Describe Wound)   [] New Altered Skin Integrity was Present on Admit and Documented in LDA   [] Wound Image Taken    Wound Care Consulted? No    Attending Nurse:  Lovely Ahn LPN     Second RN/Staff Member: Keyonna Laws RN

## 2023-06-19 NOTE — Clinical Note
Diagnosis: Vaginal hemorrhage [378875]   Future Attending Provider: RJ MENDES [[id number]]   Is the patient being sent to ED Observation?: No   Admitting Provider:: RJ MENDES [[id number]]   Special Needs:: No Special Needs [1]

## 2023-06-19 NOTE — DISCHARGE SUMMARY
Discharge Summary  Gynecology      Admit Date: 2023    Discharge Date and Time: 2023     Attending Physician: Nadia Chase MD    Principal Diagnoses: SAB (spontaneous )    Active Hospital Problems    Diagnosis  POA    *SAB (spontaneous ) [O03.9]  Unknown      Resolved Hospital Problems   No resolved problems to display.     History of Present Illness:  Inessa Salgado is a 22 y.o.  who presented to the [facility] ED as a transfer due to persistent abdominal and pelvic pain and heavy vaginal bleeding in setting of SAB. Pt was admitted to the Gyn service for monitoring and pain control.     Pt had presented to the ED on , 2 days ago, and was diagnosed with MAB and desired medical management with cytotec. Pt placed the cytotec yesterday and began having painful cramping and heavy bleeding in the early afternoon for which she presented to [facility] ED yesterday evening. Her TVUS was negative with no gestational sac seen, H/H was stable, VSS and WNL, and she was discharged home in stable condition. She then presented to the [facility] ED after being discharged from [facility] due to continued heavy vaginal bleeding with large clots. Her H/H had decreased from 10/33>8/26 for which 1upRBC was transfused. She was transferred back to [facility] for evaluation by gynecology.     On arrival, pt reports continued pain and bleeding unrelieved with any medications given. Pt states she is very anxious about the amount of bleeding she is having and she feels like something isn't right. Denies fever, chills, N/V/D, syncope, LOC. Reports she was feeling weak and anxious when she presented to the [facility] ED but is now feeling better s/p her transfusion. Denies headaches, vision changes, dysuria, hematuria, back pain. Patient reports allergy to morphine, but is unsure of reaction. Has received fentanyl on arrival.    Interval history:  Patient's bleeding improved after POC were removed. She had  an appropriate rise in H/H after receiving 1u pRBC. She denies symptoms of anemia.She reports her pain is well controlled and her bleeding has decreased significantly. Patient was cleared for discharge yesterday but was kept overnight due to nausea and vomiting. On discharge day, she denies nausea or vomiting. Patient is stable for discharge. Return precautions given. Will follow up with patient in GYN resident clinic.    Discharged Condition: good      Significant Diagnostic Studies:  Recent Labs   Lab 06/18/23  2229 06/19/23  0122 06/19/23  0905   WBC 14.47* 11.78 10.01   HGB 10.6* 8.4* 9.2*   HCT 33.6* 26.5* 27.5*   MCV 88 86 87    230 197        Treatments:  Patient received 1u pRBC with appropriate rise in H/H.    Disposition: Home or Self Care    Patient Instructions:   Current Discharge Medication List        START taking these medications    Details   ferrous sulfate (IRON) 325 mg (65 mg iron) Tab tablet Take 1 tablet (325 mg total) by mouth As instructed (Every other day).  Qty: 30 tablet, Refills: 1           CONTINUE these medications which have NOT CHANGED    Details   acetaminophen (TYLENOL) 325 MG tablet Take 2 tablets (650 mg total) by mouth every 6 (six) hours. Alternate between ibuprofen and tylenol every 3 hours. For example: @0800: ibuprofen 600mg @1100: tylenol 650mg @1400: ibuprofen 600mg @1700: tylenol 650 mg @2000: ibuprofen 600mg for 7 days  Qty: 56 tablet, Refills: 0      BALCOLTRA 0.1 mg-0.02 mg (21)/36.5 mg(7) Tab Take 1 tablet by mouth once daily.      benzoyl peroxide 5 % external liquid WASH FACE EVERY DAY AS DIRECTED      HYDROcodone-acetaminophen (NORCO) 5-325 mg per tablet Take 1 tablet by mouth every 4 (four) hours as needed for Pain.  Qty: 12 tablet, Refills: 0      ibuprofen (ADVIL,MOTRIN) 800 MG tablet Take 1 tablet (800 mg total) by mouth every 6 (six) hours as needed for Pain.  Qty: 30 tablet, Refills: 0      miSOPROStoL (CYTOTEC) 200 MCG Tab Place 4 tablets (800 mcg  total) vaginally every 3 (three) hours as needed (Repeat dose after 3 hours if no cramping or vaginal bleeding occurs). If you do not have bleeding or cramping after 3 hours, repeat the dose and place 800mcg or 4 tablets intravaginally.  Qty: 8 tablet, Refills: 0    Associated Diagnoses: Missed       omeprazole (PRILOSEC) 40 MG capsule Take 1 capsule (40 mg total) by mouth once daily.  Qty: 30 capsule, Refills: 11    Associated Diagnoses: Abdominal bloating; Nausea and vomiting, unspecified vomiting type      ondansetron (ZOFRAN-ODT) 4 MG TbDL Take 1 tablet (4 mg total) by mouth every 6 (six) hours as needed (Nausea and vomiting).  Qty: 30 tablet, Refills: 2             Discharge Procedure Orders   Ambulatory referral/consult to Psychiatry   Standing Status: Future   Referral Priority: Routine Referral Type: Psychiatric   Referral Reason: Specialty Services Required   Requested Specialty: Psychiatry   Number of Visits Requested: 1     Diet general     Call MD for:  temperature >100.4     Call MD for:  persistent nausea and vomiting     Call MD for:  severe uncontrolled pain     Call MD for:  difficulty breathing, headache or visual disturbances     Call MD for:  redness, tenderness, or signs of infection (pain, swelling, redness, odor or green/yellow discharge around incision site)     Call MD for:  hives     Call MD for:   Order Comments: Heavy bleeding. Saturating more than one pad per hour.         Ben Arora MD  OBGYN PGY-1

## 2023-06-20 ENCOUNTER — TELEPHONE (OUTPATIENT)
Dept: OBSTETRICS AND GYNECOLOGY | Facility: CLINIC | Age: 23
End: 2023-06-20
Payer: MEDICAID

## 2023-06-20 VITALS
OXYGEN SATURATION: 99 % | RESPIRATION RATE: 16 BRPM | BODY MASS INDEX: 24.52 KG/M2 | HEART RATE: 78 BPM | TEMPERATURE: 98 F | HEIGHT: 61 IN | SYSTOLIC BLOOD PRESSURE: 104 MMHG | WEIGHT: 129.88 LBS | DIASTOLIC BLOOD PRESSURE: 55 MMHG

## 2023-06-20 PROCEDURE — 99232 SBSQ HOSP IP/OBS MODERATE 35: CPT | Mod: ,,, | Performed by: OBSTETRICS & GYNECOLOGY

## 2023-06-20 PROCEDURE — 99232 PR SUBSEQUENT HOSPITAL CARE,LEVL II: ICD-10-PCS | Mod: ,,, | Performed by: OBSTETRICS & GYNECOLOGY

## 2023-06-20 PROCEDURE — 25000003 PHARM REV CODE 250: Performed by: GENERAL PRACTICE

## 2023-06-20 PROCEDURE — G0378 HOSPITAL OBSERVATION PER HR: HCPCS

## 2023-06-20 RX ORDER — FERROUS SULFATE 325(65) MG
325 TABLET ORAL SEE ADMIN INSTRUCTIONS
Qty: 30 TABLET | Refills: 1 | Status: SHIPPED | OUTPATIENT
Start: 2023-06-20

## 2023-06-20 RX ADMIN — ONDANSETRON 8 MG: 8 TABLET, ORALLY DISINTEGRATING ORAL at 01:06

## 2023-06-20 RX ADMIN — ACETAMINOPHEN 650 MG: 325 TABLET, FILM COATED ORAL at 06:06

## 2023-06-20 RX ADMIN — IBUPROFEN 400 MG: 400 TABLET ORAL at 06:06

## 2023-06-20 RX ADMIN — ACETAMINOPHEN 650 MG: 325 TABLET, FILM COATED ORAL at 10:06

## 2023-06-20 RX ADMIN — IBUPROFEN 400 MG: 400 TABLET ORAL at 11:06

## 2023-06-20 RX ADMIN — ONDANSETRON 8 MG: 8 TABLET, ORALLY DISINTEGRATING ORAL at 06:06

## 2023-06-20 NOTE — CARE UPDATE
Resident to bedside to discuss discharge planning with patient and her Mom. Her mom was present over the phone.Patient's mom had questions regarding her CBC. Discussed with patient and mom that her CBC is stable after receiving 1 unit of pRBC. Repeat is not indicated at that time. Will give iron supplementation. Patient and Mom voiced understanding and agreement with the plan. Follow up in gyn resident clinic. Strict return precautions given. Plan for discharge today.     Ben Arora MD  OBGYN PGY-1

## 2023-06-20 NOTE — CARE UPDATE
Resident to bedside for PM eval    NST Reactive and reassuring  TOCO with ctx 1 2-3 minutes, pt asymptomatic    SVE closed/thick/high    Plan:  1L LR given  Will continue to monitor until fluid bolus completed, if tracing remains reactive and reassuring and pt continues to be asymptomatic, can take off EFM    Carmela Peña MD  OBGYN PGY-2

## 2023-06-20 NOTE — PLAN OF CARE
AAOX4. VSS. Vaginally spotting. Ambulates to bathroom with stand by assist. Family at bedside. Vomited 3 times on shift. MD and resident aware. New Iv placed. Safety maintained and continue to monitor.   Problem: Adult Inpatient Plan of Care  Goal: Plan of Care Review  Outcome: Ongoing, Progressing  Goal: Patient-Specific Goal (Individualized)  Outcome: Ongoing, Progressing  Goal: Absence of Hospital-Acquired Illness or Injury  Outcome: Ongoing, Progressing  Goal: Optimal Comfort and Wellbeing  Outcome: Ongoing, Progressing  Goal: Readiness for Transition of Care  Outcome: Ongoing, Progressing

## 2023-06-20 NOTE — PROGRESS NOTES
[facility] Surg ([address]  Obstetrics & Gynecology  Progress Note    Patient Name: Inessa Salgado  MRN: [id number]  Admission Date: 2023  Primary Care Provider: Vesta Ye NP  Principal Problem: SAB (spontaneous )    Subjective:   HPI:  Inessa Salgado is a 22 y.o.  who presented to the [facility] ED as a transfer due to persistent abdominal and pelvic pain and heavy vaginal bleeding in setting of SAB. Pt was admitted to the Gyn service for monitoring and pain control.     Pt had presented to the ED on , 2 days ago, and was diagnosed with MAB and desired medical management with cytotec. Pt placed the cytotec yesterday and began having painful cramping and heavy bleeding in the early afternoon for which she presented to the [facility] ED yesterday evening. Her TVUS was negative with no gestational sac seen, H/H was stable, VSS and WNL, and she was discharged home in stable condition. She then presented to the [facility] ED after being discharged from [facility] due to continued heavy vaginal bleeding with large clots. Her H/H had decreased from 10/33>8/26 for which 1upRBC was transfused. She was transferred back to [facility] for evaluation by gynecology.     On arrival, pt reports continued pain and bleeding unrelieved with any medications given. Pt states she is very anxious about the amount of bleeding she is having and she feels like something isn't right. Denies fever, chills, N/V/D, syncope, LOC. Reports she was feeling weak and anxious when she presented to the [facility] ED but is now feeling better s/p her transfusion. Denies headaches, vision changes, dysuria, hematuria, back pain. Patient reports allergy to morphine, but is unsure of reaction. Has received fentanyl on arrival.    Interval History: Patient was admitted to GYN service for pain control and monitoring. She had no acute events overnight. Patient reports feeling much better after getting a full night's rest. She  denies abdominal pain, fever, chills, CP, SOB, lightheadedness or dizziness. She has not had any nausea or vomiting since yesterday evening. Her bleeding is minimal. Patient endorses anxiety about having a miscarriage in the future again and is appropriately tearful regarding her loss.     Scheduled Meds:   acetaminophen  650 mg Oral Q6H    ibuprofen  400 mg Oral Q6H     Continuous Infusions:  PRN Meds:HYDROmorphone, ondansetron, oxyCODONE, oxyCODONE, prochlorperazine, sodium chloride 0.9%    Review of patient's allergies indicates:   Allergen Reactions    Augmentin [amoxicillin-pot clavulanate] Hives    Morphine     Suprax [cefixime]        Objective:     Vital Signs (Most Recent):  Temp: 98.4 °F (36.9 °C) (06/20/23 0544)  Pulse: 80 (06/20/23 0544)  Resp: 18 (06/20/23 0544)  BP: (!) 92/51 (06/20/23 0544)  SpO2: 98 % (06/20/23 0544) Vital Signs (24h Range):  Temp:  [98.1 °F (36.7 °C)-98.5 °F (36.9 °C)] 98.4 °F (36.9 °C)  Pulse:  [64-95] 80  Resp:  [16-20] 18  SpO2:  [98 %-100 %] 98 %  BP: ()/(50-57) 92/51     Weight: 58.9 kg (129 lb 13.6 oz)  Body mass index is 24.54 kg/m².  Patient's last menstrual period was 04/01/2023.    I&O (Last 24H):  No intake or output data in the 24 hours ending 06/20/23 0616    Physical Exam:   Constitutional: She is oriented to person, place, and time. She appears well-developed and well-nourished.    HENT:   Head: Normocephalic and atraumatic.    Eyes: EOM are normal.     Cardiovascular:  Normal rate.             Pulmonary/Chest: Effort normal. No respiratory distress.        Abdominal: There is no abdominal tenderness. There is no guarding.                 Neurological: She is alert and oriented to person, place, and time.    Skin: Skin is warm and dry.    Psychiatric: Her behavior is normal. Thought content normal. Her mood appears anxious. She has a tearful affect.     Laboratory:    Recent Labs   Lab 06/18/23  2229 06/19/23  0122 06/19/23  0905   WBC 14.47* 11.78 10.01   HGB  10.6* 8.4* 9.2*   HCT 33.6* 26.5* 27.5*   MCV 88 86 87    230 197           Assessment/Plan:     Active Diagnoses:    Diagnosis Date Noted POA    PRINCIPAL PROBLEM:  SAB (spontaneous ) [O03.9] 2023 Unknown      Problems Resolved During this Admission:       Inessa Salgado is a 22 y.o. admitted for pain control and blood loss monitoring after SAB.    SAB  - Patient afebrile, VSS. Reports minimal bleeding  - CBC: 10/9.2/.5/197; s/p 1u pRBC  - Pathology pending   - Diet: regular  - Pain control: ibuprofen/tylenol oxy PRN, dilaudid BTP    Nausea/Vomiting  - Zofran/Compazine PRN  - Patient reports nausea has improved. No episodes of vomiting since yesterday evening.       Mood Disorder  - Patient endorses anxiety about having a miscarriage in the future again and is appropriately tearful regarding her loss.   - Will put in referral for outpatient counseling     Dispo: Patient stable. Anticipate discharge today pending PO challenge this morning.     Ben Arora MD  Obstetrics & Gynecology  [facility] Surg ([address])

## 2023-06-20 NOTE — PLAN OF CARE
AAOX4. VSS. BP runs low, Md aware. MD called pt family and order iron for home meds. Pt n/v once during shift, zofran given and stopped vomiting. Pt continues to spot vaginally. Ambulates to bathroom. Family at bedside and attentive to pt. IV removed, meds delivered at bedside. Explained discharge information, answered questions about follow up appointments. No falls or injuries during shift. Safety maintained throughout shift, transport placed, discharge complete.   Problem: Adult Inpatient Plan of Care  Goal: Plan of Care Review  Outcome: Met  Goal: Patient-Specific Goal (Individualized)  Outcome: Met  Goal: Absence of Hospital-Acquired Illness or Injury  Outcome: Met  Goal: Optimal Comfort and Wellbeing  Outcome: Met  Goal: Readiness for Transition of Care  Outcome: Met

## 2023-06-20 NOTE — TELEPHONE ENCOUNTER
----- Message from Pretty Pelayo MD sent at 6/17/2023  7:34 PM CDT -----  Minna Irwin! Could this patient please be scheduled for an appointment in family planning clinic this Wednesday? We treated her for a missed ab with medication.     Thanks,     Justa

## 2023-06-20 NOTE — PLAN OF CARE
Follow up appointment noted on AVS.    Family to provide transportation home.   06/20/23 1013   Final Note   Assessment Type Final Discharge Note   Anticipated Discharge Disposition Home   Hospital Resources/Appts/Education Provided Provided patient/caregiver with written discharge plan information;Appointments scheduled and added to AVS   Post-Acute Status   Discharge Delays None known at this time     [facility] ([address])  Discharge Final Note    Primary Care Provider: Vesta Ye NP    Expected Discharge Date: 6/20/2023    Final Discharge Note (most recent)       Final Note - 06/20/23 1013          Final Note    Assessment Type Final Discharge Note (P)      Anticipated Discharge Disposition Home or Self Care (P)      Hospital Resources/Appts/Education Provided Provided patient/caregiver with written discharge plan information;Appointments scheduled and added to AVS (P)         Post-Acute Status    Discharge Delays None known at this time (P)                      Important Message from Medicare

## 2023-06-21 ENCOUNTER — HOSPITAL ENCOUNTER (EMERGENCY)
Facility: OTHER | Age: 23
Discharge: HOME | End: 2023-06-22
Attending: EMERGENCY MEDICINE
Payer: MEDICAID

## 2023-06-21 ENCOUNTER — TELEPHONE (OUTPATIENT)
Dept: OBSTETRICS AND GYNECOLOGY | Facility: OTHER | Age: 23
End: 2023-06-21
Payer: MEDICAID

## 2023-06-21 DIAGNOSIS — O03.9 MISCARRIAGE: Primary | ICD-10-CM

## 2023-06-21 DIAGNOSIS — R10.30 LOWER ABDOMINAL PAIN: ICD-10-CM

## 2023-06-21 DIAGNOSIS — O03.4 RETAINED PRODUCTS OF CONCEPTION AFTER MISCARRIAGE: ICD-10-CM

## 2023-06-21 LAB
ANION GAP SERPL CALC-SCNC: 10 MMOL/L (ref 8–16)
BACTERIA #/AREA URNS HPF: ABNORMAL /HPF
BASOPHILS # BLD AUTO: 0.04 K/UL (ref 0–0.2)
BASOPHILS NFR BLD: 0.4 % (ref 0–1.9)
BILIRUB UR QL STRIP: NEGATIVE
BUN SERPL-MCNC: 10 MG/DL (ref 6–20)
CALCIUM SERPL-MCNC: 9.3 MG/DL (ref 8.7–10.5)
CHLORIDE SERPL-SCNC: 106 MMOL/L (ref 95–110)
CLARITY UR: ABNORMAL
CO2 SERPL-SCNC: 24 MMOL/L (ref 23–29)
COLOR UR: YELLOW
CREAT SERPL-MCNC: 0.7 MG/DL (ref 0.5–1.4)
DIFFERENTIAL METHOD: ABNORMAL
EOSINOPHIL # BLD AUTO: 0.3 K/UL (ref 0–0.5)
EOSINOPHIL NFR BLD: 2.4 % (ref 0–8)
ERYTHROCYTE [DISTWIDTH] IN BLOOD BY AUTOMATED COUNT: 13.6 % (ref 11.5–14.5)
EST. GFR  (NO RACE VARIABLE): >60 ML/MIN/1.73 M^2
GLUCOSE SERPL-MCNC: 88 MG/DL (ref 70–110)
GLUCOSE UR QL STRIP: NEGATIVE
HCT VFR BLD AUTO: 30.8 % (ref 37–48.5)
HGB BLD-MCNC: 9.9 G/DL (ref 12–16)
HGB UR QL STRIP: ABNORMAL
IMM GRANULOCYTES # BLD AUTO: 0.06 K/UL (ref 0–0.04)
IMM GRANULOCYTES NFR BLD AUTO: 0.5 % (ref 0–0.5)
KETONES UR QL STRIP: NEGATIVE
LEUKOCYTE ESTERASE UR QL STRIP: NEGATIVE
LYMPHOCYTES # BLD AUTO: 4 K/UL (ref 1–4.8)
LYMPHOCYTES NFR BLD: 36 % (ref 18–48)
MCH RBC QN AUTO: 27.7 PG (ref 27–31)
MCHC RBC AUTO-ENTMCNC: 32.1 G/DL (ref 32–36)
MCV RBC AUTO: 86 FL (ref 82–98)
MICROSCOPIC COMMENT: ABNORMAL
MONOCYTES # BLD AUTO: 0.8 K/UL (ref 0.3–1)
MONOCYTES NFR BLD: 7.3 % (ref 4–15)
NEUTROPHILS # BLD AUTO: 5.9 K/UL (ref 1.8–7.7)
NEUTROPHILS NFR BLD: 53.4 % (ref 38–73)
NITRITE UR QL STRIP: NEGATIVE
NRBC BLD-RTO: 0 /100 WBC
PH UR STRIP: 7 [PH] (ref 5–8)
PLATELET # BLD AUTO: 255 K/UL (ref 150–450)
PMV BLD AUTO: 9.3 FL (ref 9.2–12.9)
POTASSIUM SERPL-SCNC: 4 MMOL/L (ref 3.5–5.1)
PROT UR QL STRIP: NEGATIVE
RBC # BLD AUTO: 3.57 M/UL (ref 4–5.4)
RBC #/AREA URNS HPF: >100 /HPF (ref 0–4)
SODIUM SERPL-SCNC: 140 MMOL/L (ref 136–145)
SP GR UR STRIP: 1.02 (ref 1–1.03)
SQUAMOUS #/AREA URNS HPF: 3 /HPF
URN SPEC COLLECT METH UR: ABNORMAL
UROBILINOGEN UR STRIP-ACNC: NEGATIVE EU/DL
WBC # BLD AUTO: 11.07 K/UL (ref 3.9–12.7)
WBC #/AREA URNS HPF: 3 /HPF (ref 0–5)
YEAST URNS QL MICRO: ABNORMAL

## 2023-06-21 PROCEDURE — 81000 URINALYSIS NONAUTO W/SCOPE: CPT | Performed by: EMERGENCY MEDICINE

## 2023-06-21 PROCEDURE — 63600175 PHARM REV CODE 636 W HCPCS: Performed by: EMERGENCY MEDICINE

## 2023-06-21 PROCEDURE — 80048 BASIC METABOLIC PNL TOTAL CA: CPT | Performed by: EMERGENCY MEDICINE

## 2023-06-21 PROCEDURE — 85025 COMPLETE CBC W/AUTO DIFF WBC: CPT | Performed by: EMERGENCY MEDICINE

## 2023-06-21 PROCEDURE — 86900 BLOOD TYPING SEROLOGIC ABO: CPT | Performed by: EMERGENCY MEDICINE

## 2023-06-21 PROCEDURE — 99285 EMERGENCY DEPT VISIT HI MDM: CPT

## 2023-06-21 PROCEDURE — 96374 THER/PROPH/DIAG INJ IV PUSH: CPT

## 2023-06-21 RX ORDER — KETOROLAC TROMETHAMINE 30 MG/ML
10 INJECTION, SOLUTION INTRAMUSCULAR; INTRAVENOUS
Status: COMPLETED | OUTPATIENT
Start: 2023-06-21 | End: 2023-06-21

## 2023-06-21 RX ADMIN — KETOROLAC TROMETHAMINE 10 MG: 30 INJECTION, SOLUTION INTRAMUSCULAR; INTRAVENOUS at 11:06

## 2023-06-22 ENCOUNTER — PATIENT MESSAGE (OUTPATIENT)
Dept: OBSTETRICS AND GYNECOLOGY | Facility: CLINIC | Age: 23
End: 2023-06-22

## 2023-06-22 ENCOUNTER — OFFICE VISIT (OUTPATIENT)
Dept: OBSTETRICS AND GYNECOLOGY | Facility: CLINIC | Age: 23
End: 2023-06-22
Payer: MEDICAID

## 2023-06-22 ENCOUNTER — TELEPHONE (OUTPATIENT)
Dept: OBSTETRICS AND GYNECOLOGY | Facility: CLINIC | Age: 23
End: 2023-06-22
Payer: MEDICAID

## 2023-06-22 VITALS
HEART RATE: 62 BPM | WEIGHT: 125 LBS | TEMPERATURE: 98 F | OXYGEN SATURATION: 100 % | RESPIRATION RATE: 16 BRPM | SYSTOLIC BLOOD PRESSURE: 132 MMHG | DIASTOLIC BLOOD PRESSURE: 64 MMHG | BODY MASS INDEX: 23.62 KG/M2

## 2023-06-22 VITALS
BODY MASS INDEX: 23.68 KG/M2 | DIASTOLIC BLOOD PRESSURE: 72 MMHG | SYSTOLIC BLOOD PRESSURE: 110 MMHG | HEIGHT: 61 IN | WEIGHT: 125.44 LBS

## 2023-06-22 DIAGNOSIS — O02.1 MISSED ABORTION: ICD-10-CM

## 2023-06-22 DIAGNOSIS — O03.4 RETAINED PRODUCTS OF CONCEPTION AFTER MISCARRIAGE: Primary | ICD-10-CM

## 2023-06-22 LAB
ABO + RH BLD: NORMAL
BLD GP AB SCN CELLS X3 SERPL QL: NORMAL
FINAL PATHOLOGIC DIAGNOSIS: NORMAL
FINAL PATHOLOGIC DIAGNOSIS: NORMAL
GROSS: NORMAL
GROSS: NORMAL
Lab: NORMAL
Lab: NORMAL
SPECIMEN OUTDATE: NORMAL

## 2023-06-22 PROCEDURE — 3078F DIAST BP <80 MM HG: CPT | Mod: CPTII,,, | Performed by: OBSTETRICS & GYNECOLOGY

## 2023-06-22 PROCEDURE — 25000003 PHARM REV CODE 250

## 2023-06-22 PROCEDURE — 99213 PR OFFICE/OUTPT VISIT, EST, LEVL III, 20-29 MIN: ICD-10-PCS | Mod: 57,S$PBB,TH, | Performed by: OBSTETRICS & GYNECOLOGY

## 2023-06-22 PROCEDURE — 3008F BODY MASS INDEX DOCD: CPT | Mod: CPTII,,, | Performed by: OBSTETRICS & GYNECOLOGY

## 2023-06-22 PROCEDURE — 3074F SYST BP LT 130 MM HG: CPT | Mod: CPTII,,, | Performed by: OBSTETRICS & GYNECOLOGY

## 2023-06-22 PROCEDURE — 99213 OFFICE O/P EST LOW 20 MIN: CPT | Mod: 57,S$PBB,TH, | Performed by: OBSTETRICS & GYNECOLOGY

## 2023-06-22 PROCEDURE — 3008F PR BODY MASS INDEX (BMI) DOCUMENTED: ICD-10-PCS | Mod: CPTII,,, | Performed by: OBSTETRICS & GYNECOLOGY

## 2023-06-22 PROCEDURE — 99213 OFFICE O/P EST LOW 20 MIN: CPT | Mod: TH,,, | Performed by: STUDENT IN AN ORGANIZED HEALTH CARE EDUCATION/TRAINING PROGRAM

## 2023-06-22 PROCEDURE — 99999 PR PBB SHADOW E&M-EST. PATIENT-LVL III: CPT | Mod: PBBFAC,,, | Performed by: GENERAL PRACTICE

## 2023-06-22 PROCEDURE — 99213 PR OFFICE/OUTPT VISIT, EST, LEVL III, 20-29 MIN: ICD-10-PCS | Mod: TH,,, | Performed by: STUDENT IN AN ORGANIZED HEALTH CARE EDUCATION/TRAINING PROGRAM

## 2023-06-22 PROCEDURE — 3074F PR MOST RECENT SYSTOLIC BLOOD PRESSURE < 130 MM HG: ICD-10-PCS | Mod: CPTII,,, | Performed by: OBSTETRICS & GYNECOLOGY

## 2023-06-22 PROCEDURE — 99213 OFFICE O/P EST LOW 20 MIN: CPT | Mod: PBBFAC,TH | Performed by: GENERAL PRACTICE

## 2023-06-22 PROCEDURE — 99999 PR PBB SHADOW E&M-EST. PATIENT-LVL III: ICD-10-PCS | Mod: PBBFAC,,, | Performed by: GENERAL PRACTICE

## 2023-06-22 PROCEDURE — 3078F PR MOST RECENT DIASTOLIC BLOOD PRESSURE < 80 MM HG: ICD-10-PCS | Mod: CPTII,,, | Performed by: OBSTETRICS & GYNECOLOGY

## 2023-06-22 RX ORDER — LIDOCAINE HYDROCHLORIDE 10 MG/ML
1 INJECTION, SOLUTION EPIDURAL; INFILTRATION; INTRACAUDAL; PERINEURAL ONCE
Status: CANCELLED | OUTPATIENT
Start: 2023-06-22 | End: 2023-06-22

## 2023-06-22 RX ORDER — OXYCODONE HYDROCHLORIDE 5 MG/1
5 TABLET ORAL ONCE
Status: COMPLETED | OUTPATIENT
Start: 2023-06-22 | End: 2023-06-22

## 2023-06-22 RX ORDER — OXYCODONE HYDROCHLORIDE 5 MG/1
5 TABLET ORAL EVERY 4 HOURS PRN
Qty: 5 TABLET | Refills: 0 | Status: SHIPPED | OUTPATIENT
Start: 2023-06-22 | End: 2023-09-29

## 2023-06-22 RX ADMIN — OXYCODONE HYDROCHLORIDE 5 MG: 5 TABLET ORAL at 02:06

## 2023-06-22 NOTE — DISCHARGE INSTRUCTIONS
Mrs. Salgado,    Thank you for letting me care for you today! It was nice meeting you, and I hope you feel better soon.   If you would like access to your chart and what was done today please utilize the [facility].   Please come back to [facility] for all of your future medical needs.    Our goal in the emergency department is to always give you outstanding care and exceptional service. You may receive a survey by mail or e-mail in the next week regarding your experience in our ED. We would greatly appreciate you completing and returning the survey. Your feedback provides us with a way to recognize our staff who give very good care and it helps us learn how to improve when your experience was below our aspiration of excellence.     Sincerely,    Anival Blackwood MD  Board Certified Emergency Physician

## 2023-06-22 NOTE — TELEPHONE ENCOUNTER
LVM  Per Dr Arora, pt to come to GYN Res clinic today for Preop S/D&C  Message sent to pt via [employer] with update.

## 2023-06-22 NOTE — PROGRESS NOTES
History and Physical   [facility] Gynecology     Reason for Visit   Pre-op Exam    HPI   Inessa Salgado is a 22 y.o.  who presents for pre op evaluation for suction D&C 2/2 retained POC. Pt was recently admitted for monitoring s/p a medically managed SAB due to continued bleeding and pain. On , products of conception removed in the ED and pt was subsequently admitted for monitoring. She was discharged home  the next day () in stable condition. On , she presented to [facility] ED due to suprapubic and pelvic pain that is sharp and intermittent in nature, unrelieved with tylenol and ibuprofen. TVUS revealed possible retained POC.     Diagnostic Results:  US: Reviewed  FINDINGS:  The uterus measures 8.7 x 4.4 x 5.4 cm. There are no uterine masses.  Thickened heterogeneous endometrium measures up to 2.1 cm in thickness.  There is increased myometrial vascularity.  There is also some increased vascularity seen at the superior portion of the heterogeneous endometrium.  There is no fetal pole, yolk sac, or gestational sac.     The right ovary measures 1.3 x 1.9 x 1.3 cm. The left ovary measures 2.2 x 1.1 x 1.9 cm.     There is no free fluid seen in the posterior cul-de-sac.     Impression:     Findings are concerning for hemorrhagic and retained products    Patient's last menstrual period was 2023.      Review of Systems   Constitutional:  Negative for fever.   Respiratory:  Negative for shortness of breath.    Cardiovascular:  Negative for chest pain.   Gastrointestinal:  Negative for abdominal pain, nausea and vomiting.   Endocrine: Negative for hot flashes.   Genitourinary:  Positive for pelvic pain and vaginal bleeding. Negative for menstrual problem.   Integumentary:  Negative for breast mass, nipple discharge and breast skin changes.   Neurological:  Negative for headaches.   Hematological:  Does not bruise/bleed easily.   Psychiatric/Behavioral:  Negative for depression.    Breast: Negative for  "mass, mastodynia, nipple discharge and skin changes    Past Medical History:   Diagnosis Date    Seizures     febrile seizure     Past Surgical History:   Procedure Laterality Date    ESOPHAGOGASTRODUODENOSCOPY  11/03/2022    ESOPHAGOGASTRODUODENOSCOPY N/A 11/3/2022    Procedure: EGD (ESOPHAGOGASTRODUODENOSCOPY);  Surgeon: Ronnie Garcia MD;  Location: [facility];  Service: Endoscopy;  Laterality: N/A;       Social History     Tobacco Use    Smoking status: Never    Smokeless tobacco: Never   Substance Use Topics    Alcohol use: Yes     Comment: occasionally     Family History   Problem Relation Age of Onset    Heart disease Mother     Hyperlipidemia Mother     Hypertension Father     Hyperlipidemia Father      OB History   No obstetric history on file.       Current Outpatient Medications   Medication Instructions    acetaminophen (TYLENOL) 650 mg, Oral, Every 6 hours, Alternate between ibuprofen and tylenol every 3 hours. For example: @0800: ibuprofen 600mg @1100: tylenol 650mg @1400: ibuprofen 600mg @1700: tylenol 650 mg @2000: ibuprofen 600mg    BALCOLTRA 0.1 mg-0.02 mg (21)/36.5 mg(7) Tab 1 tablet, Oral, Daily    benzoyl peroxide 5 % external liquid WASH FACE EVERY DAY AS DIRECTED    FeroSuL 325 mg, Oral, See admin instructions    HYDROcodone-acetaminophen (NORCO) 5-325 mg per tablet 1 tablet, Oral, Every 4 hours PRN    ibuprofen (ADVIL,MOTRIN) 800 mg, Oral, Every 6 hours PRN    miSOPROStoL (CYTOTEC) 800 mcg, Vaginal, Every 3 hours PRN, If you do not have bleeding or cramping after 3 hours, repeat the dose and place 800mcg or 4 tablets intravaginally.    omeprazole (PRILOSEC) 40 mg, Oral, Daily    ondansetron (ZOFRAN-ODT) 4 mg, Oral, Every 6 hours PRN    oxyCODONE (ROXICODONE) 5 mg, Oral, Every 4 hours PRN       Augmentin [amoxicillin-pot clavulanate], Morphine, and Suprax [cefixime]    Exam   /72   Ht 5' 1" (1.549 m)   Wt 56.9 kg (125 lb 7.1 oz)   LMP 04/01/2023   BMI 23.70 kg/m² "     Physical Exam  Constitutional:       General: She is not in acute distress.     Appearance: Normal appearance.   HENT:      Head: Normocephalic and atraumatic.   Eyes:      Extraocular Movements: Extraocular movements intact.   Cardiovascular:      Rate and Rhythm: Normal rate.   Pulmonary:      Effort: No respiratory distress.   Neurological:      General: No focal deficit present.      Mental Status: She is alert.   Skin:     General: Skin is warm and dry.   Psychiatric:         Mood and Affect: Mood normal.         Behavior: Behavior normal.         Thought Content: Thought content normal.       Assessment and Plan   Missed     Other orders  -     Place in Outpatient; Standing  -     Vital signs; Standing  -     Ambulate Up Ad Delfina; Standing  -     Insert peripheral IV; Standing  -     LIDOcaine (PF) 10 mg/ml (1%) injection 10 mg  -     Verify beta-blocker dose taken within 24 hours if patient is prescribed beta-blocker ; Standing  -     Verify discontinuation of anti thrombotics ; Standing  -     Verify Blood Consent ; Standing  -     Verify consent; Standing  -     Verify surgical site documentation; Standing  -     Void on call to OR; Standing  -     Notify Physician/Vital Signs Parameters; Standing  -     Notify physician ; Standing  -     Diet NPO; Standing  -     Pulse Oximetry Q4H; Standing  -     IP VTE LOW RISK PATIENT; Standing      Surgery counseling  - Pain and post-operative management of pain  - Bleeding and possible management for operative or post-operative bleeding  - Infection and possible management for post-operative infections  - Surgical injury, especially to bowel, bladder, or ureters and possible operative or post-operative management  - Anesthesia  - Possible blood transfusion including risks of reaction and infection  - DVT  - Death    Patient acknowledges risks and wishes to proceed with surgery.  All questions answered.  Consents signed.    Patient instructions  - To be NPO  after midnight on the evening prior to surgery  - To arrive at the hospital 2 hours prior to her scheduled case    Ben Arora MD  OBGYN PGY-1

## 2023-06-22 NOTE — CONSULTS
"[facility] - Emergency Dept  Obstetrics & Gynecology  Consult Note    Patient Name: Inessa Salgado  MRN: [id number]  Admission Date: 2023  Hospital Length of Stay: 0 days  Code Status: Prior  Primary Care Provider: Vesta Ye NP  Principal Problem: <principal problem not specified>    Inpatient consult to Obstetrics  Consult performed by: Carmela Peña MD  Consult ordered by: Anival Blackwood MD      Subjective:     Chief Complaint: suprapubic/pelvic pain    History of Present Illness:   Inessa Salgado is a 22 y.o.  who presented to the [facility] ED due to suprapubic and pelvic pain that is sharp and intermittent in nature, unrelieved with tylenol and ibuprofen. Pt was recently admitted for monitoring s/p a medically managed SAB due to continued bleeding and pain. On , products of conception removed in the ED and pt was subsequently admitted for monitoring. She was discharged home  the next day () in stable condition.   She reports minimal vaginal spotting associated with her pain, denies heavy bleeding, clots, abnormal or foul smelling discharge. Reports it feels "funny" when she urinates but denies pain. Denies fever, chills, N/V/D, dysuria, hematuria, cough, congestion, headaches, syncope, weakness, fatigue.        Current Facility-Administered Medications on File Prior to Encounter   Medication    0.9%  NaCl infusion    LIDOcaine (PF) 10 mg/ml (1%) injection 10 mg     Current Outpatient Medications on File Prior to Encounter   Medication Sig    acetaminophen (TYLENOL) 325 MG tablet Take 2 tablets (650 mg total) by mouth every 6 (six) hours. Alternate between ibuprofen and tylenol every 3 hours. For example: @0800: ibuprofen 600mg @1100: tylenol 650mg @1400: ibuprofen 600mg @1700: tylenol 650 mg @2000: ibuprofen 600mg for 7 days    BALCOLTRA 0.1 mg-0.02 mg (21)/36.5 mg(7) Tab Take 1 tablet by mouth once daily.    benzoyl peroxide 5 % external liquid WASH FACE EVERY DAY AS DIRECTED    " ferrous sulfate (IRON) 325 mg (65 mg iron) Tab tablet Take 1 tablet (325 mg total) by mouth As instructed (Every other day).    HYDROcodone-acetaminophen (NORCO) 5-325 mg per tablet Take 1 tablet by mouth every 4 (four) hours as needed for Pain.    ibuprofen (ADVIL,MOTRIN) 800 MG tablet Take 1 tablet (800 mg total) by mouth every 6 (six) hours as needed for Pain.    miSOPROStoL (CYTOTEC) 200 MCG Tab Place 4 tablets (800 mcg total) vaginally every 3 (three) hours as needed (Repeat dose after 3 hours if no cramping or vaginal bleeding occurs). If you do not have bleeding or cramping after 3 hours, repeat the dose and place 800mcg or 4 tablets intravaginally.    omeprazole (PRILOSEC) 40 MG capsule Take 1 capsule (40 mg total) by mouth once daily.    ondansetron (ZOFRAN-ODT) 4 MG TbDL Take 1 tablet (4 mg total) by mouth every 6 (six) hours as needed (Nausea and vomiting).       Review of patient's allergies indicates:   Allergen Reactions    Augmentin [amoxicillin-pot clavulanate] Hives    Morphine     Suprax [cefixime]        Past Medical History:   Diagnosis Date    Seizures     febrile seizure     OB History   No obstetric history on file.     Past Surgical History:   Procedure Laterality Date    ESOPHAGOGASTRODUODENOSCOPY  11/03/2022    ESOPHAGOGASTRODUODENOSCOPY N/A 11/3/2022    Procedure: EGD (ESOPHAGOGASTRODUODENOSCOPY);  Surgeon: Ronnie Garcia MD;  Location: [facility];  Service: Endoscopy;  Laterality: N/A;     Family History       Problem Relation (Age of Onset)    Heart disease Mother    Hyperlipidemia Mother, Father    Hypertension Father          Tobacco Use    Smoking status: Never    Smokeless tobacco: Never   Substance and Sexual Activity    Alcohol use: Yes     Comment: occasionally    Drug use: Never    Sexual activity: Never     Review of Systems   Constitutional: Negative.  Negative for chills, diaphoresis, fatigue and fever.   HENT:  Negative for nasal congestion.    Respiratory: Negative.   Negative for shortness of breath.    Cardiovascular: Negative.  Negative for chest pain and leg swelling.   Gastrointestinal: Negative.  Negative for abdominal pain, bloating, diarrhea and nausea.   Genitourinary:  Positive for pelvic pain and vaginal bleeding. Negative for dysmenorrhea, dyspareunia, dysuria, menorrhagia, menstrual problem, vaginal discharge, vaginal pain, urinary incontinence, vaginal dryness and vaginal odor.   Musculoskeletal: Negative.  Negative for back pain and leg pain.   Integumentary:  Negative for rash. Negative.   Neurological:  Negative for seizures and headaches.   All other systems reviewed and are negative.  Objective:     Vital Signs (Most Recent):  Temp: 98.4 °F (36.9 °C) (06/22/23 0252)  Pulse: 62 (06/22/23 0252)  Resp: 16 (06/22/23 0252)  BP: 132/64 (06/22/23 0252)  SpO2: 100 % (06/22/23 0252) Vital Signs (24h Range):  Temp:  [98.4 °F (36.9 °C)-98.5 °F (36.9 °C)] 98.4 °F (36.9 °C)  Pulse:  [62-76] 62  Resp:  [16-18] 16  SpO2:  [99 %-100 %] 100 %  BP: (132-155)/(55-64) 132/64     Weight: 56.7 kg (125 lb)  Body mass index is 23.62 kg/m².  Patient's last menstrual period was 04/01/2023.    Physical Exam:   Constitutional: She is oriented to person, place, and time. She appears well-developed and well-nourished. No distress.    HENT:   Head: Atraumatic.    Eyes: Conjunctivae are normal.     Cardiovascular:  Normal rate.             Pulmonary/Chest: Effort normal. No respiratory distress.        Abdominal: Soft. She exhibits no distension and no mass. There is no abdominal tenderness. There is no rebound and no guarding.             Musculoskeletal: Normal range of motion and moves all extremeties. No edema.       Neurological: She is alert and oriented to person, place, and time.    Skin: Skin is warm and dry. No erythema. No pallor.    Psychiatric: She has a normal mood and affect. Her behavior is normal.     Laboratory:  Recent Lab Results         06/21/23  2319        Anion Gap  10       Appearance, UA Hazy       Bacteria, UA Occasional       Baso # 0.04       Basophil % 0.4       Bilirubin (UA) Negative       BUN 10       Calcium 9.3       Chloride 106       CO2 24       Color, UA Yellow       Creatinine 0.7       Differential Method Automated       eGFR >60       Eos # 0.3       Eosinophil % 2.4       Glucose 88       Glucose, UA Negative       Gran # (ANC) 5.9       Gran % 53.4       Group & Rh A POS       Hematocrit 30.8       Hemoglobin 9.9       Immature Grans (Abs) 0.06  Comment: Mild elevation in immature granulocytes is non specific and   can be seen in a variety of conditions including stress response,   acute inflammation, trauma and pregnancy. Correlation with other   laboratory and clinical findings is essential.         Immature Granulocytes 0.5       INDIRECT MANISH NEG       Ketones, UA Negative       Leukocytes, UA Negative       Lymph # 4.0       Lymph % 36.0       MCH 27.7       MCHC 32.1       MCV 86       Microscopic Comment SEE COMMENT  Comment: Other formed elements not mentioned in the report are not   present in the microscopic examination.          Mono # 0.8       Mono % 7.3       MPV 9.3       NITRITE UA Negative       nRBC 0       Occult Blood UA 3+       pH, UA 7.0       Platelets 255       Potassium 4.0       Protein, UA Negative  Comment: Recommend a 24 hour urine protein or a urine   protein/creatinine ratio if globulin induced proteinuria is  clinically suspected.         RBC 3.57       RBC, UA >100       RDW 13.6       Sodium 140       Specific Gravity, UA 1.020       Specimen Outdate 06/24/2023 23:59       Specimen UA Urine, Clean Catch       Squam Epithel, UA 3       UROBILINOGEN UA Negative       WBC, UA 3       WBC 11.07       Yeast, UA None             I have personallly reviewed all pertinent lab results from the last 24 hours.    Diagnostic Results:  US: Reviewed  FINDINGS:  The uterus measures 8.7 x 4.4 x 5.4 cm. There are no uterine masses.   Thickened heterogeneous endometrium measures up to 2.1 cm in thickness.  There is increased myometrial vascularity.  There is also some increased vascularity seen at the superior portion of the heterogeneous endometrium.  There is no fetal pole, yolk sac, or gestational sac.     The right ovary measures 1.3 x 1.9 x 1.3 cm. The left ovary measures 2.2 x 1.1 x 1.9 cm.     There is no free fluid seen in the posterior cul-de-sac.     Impression:     Findings are concerning for hemorrhagic and retained products.    Assessment/Plan:     There are no hospital problems to display for this patient.      Pelvic pain s/p medical management of SAB  -VSS and WNL  -Pt currently asymptomatic, reports intermittent cramping  --s/p toradol in ED  -Physical exam benign, no peritoneal signs, no fundal or adnexal tenderness  -U/A WNL, no UTI  -H/H stable (increased from previous admission)  -TVUS with findings concerning for retained products  -Discussed above findings with patient-can not distinguish between RPOC or blood clots at this time  -Discussed all management options with patient, including expectant, repeat medical with inpatient monitoring, and surgical with suction D&C. Pt opted for suction D&C  -Pt is stable therefore suction D&C can be scheduled as outpatient  -Will message clinic staff in the AM to ensure follow up appointment today or tomorrow, pt confirms understanding  -Pt given oxy 5 prior to discharge, discharged home with 5 oxy 5 Irs   -Strict return precautions given   -Pt and mother in agreement with plan     Thank you for your consult. I will sign off. Please contact us if you have any additional questions.    Carmela Peña MD  Obstetrics & Gynecology  [facility] - Emergency Dept         Where Do You Want The Question To Include Opioid Counseling Located?: Case Summary Tab

## 2023-06-22 NOTE — H&P (VIEW-ONLY)
History and Physical   [facility] Gynecology     Reason for Visit   Pre-op Exam    HPI   Inessa Salgado is a 22 y.o.  who presents for pre op evaluation for suction D&C 2/2 retained POC. Pt was recently admitted for monitoring s/p a medically managed SAB due to continued bleeding and pain. On , products of conception removed in the ED and pt was subsequently admitted for monitoring. She was discharged home  the next day () in stable condition. On , she presented to [facility] ED due to suprapubic and pelvic pain that is sharp and intermittent in nature, unrelieved with tylenol and ibuprofen. TVUS revealed possible retained POC.     Diagnostic Results:  US: Reviewed  FINDINGS:  The uterus measures 8.7 x 4.4 x 5.4 cm. There are no uterine masses.  Thickened heterogeneous endometrium measures up to 2.1 cm in thickness.  There is increased myometrial vascularity.  There is also some increased vascularity seen at the superior portion of the heterogeneous endometrium.  There is no fetal pole, yolk sac, or gestational sac.     The right ovary measures 1.3 x 1.9 x 1.3 cm. The left ovary measures 2.2 x 1.1 x 1.9 cm.     There is no free fluid seen in the posterior cul-de-sac.     Impression:     Findings are concerning for hemorrhagic and retained products    Patient's last menstrual period was 2023.      Review of Systems   Constitutional:  Negative for fever.   Respiratory:  Negative for shortness of breath.    Cardiovascular:  Negative for chest pain.   Gastrointestinal:  Negative for abdominal pain, nausea and vomiting.   Endocrine: Negative for hot flashes.   Genitourinary:  Positive for pelvic pain and vaginal bleeding. Negative for menstrual problem.   Integumentary:  Negative for breast mass, nipple discharge and breast skin changes.   Neurological:  Negative for headaches.   Hematological:  Does not bruise/bleed easily.   Psychiatric/Behavioral:  Negative for depression.    Breast: Negative for  "mass, mastodynia, nipple discharge and skin changes    Past Medical History:   Diagnosis Date    Seizures     febrile seizure     Past Surgical History:   Procedure Laterality Date    ESOPHAGOGASTRODUODENOSCOPY  11/03/2022    ESOPHAGOGASTRODUODENOSCOPY N/A 11/3/2022    Procedure: EGD (ESOPHAGOGASTRODUODENOSCOPY);  Surgeon: Ronnie Garcia MD;  Location: [facility];  Service: Endoscopy;  Laterality: N/A;       Social History     Tobacco Use    Smoking status: Never    Smokeless tobacco: Never   Substance Use Topics    Alcohol use: Yes     Comment: occasionally     Family History   Problem Relation Age of Onset    Heart disease Mother     Hyperlipidemia Mother     Hypertension Father     Hyperlipidemia Father      OB History   No obstetric history on file.       Current Outpatient Medications   Medication Instructions    acetaminophen (TYLENOL) 650 mg, Oral, Every 6 hours, Alternate between ibuprofen and tylenol every 3 hours. For example: @0800: ibuprofen 600mg @1100: tylenol 650mg @1400: ibuprofen 600mg @1700: tylenol 650 mg @2000: ibuprofen 600mg    BALCOLTRA 0.1 mg-0.02 mg (21)/36.5 mg(7) Tab 1 tablet, Oral, Daily    benzoyl peroxide 5 % external liquid WASH FACE EVERY DAY AS DIRECTED    FeroSuL 325 mg, Oral, See admin instructions    HYDROcodone-acetaminophen (NORCO) 5-325 mg per tablet 1 tablet, Oral, Every 4 hours PRN    ibuprofen (ADVIL,MOTRIN) 800 mg, Oral, Every 6 hours PRN    miSOPROStoL (CYTOTEC) 800 mcg, Vaginal, Every 3 hours PRN, If you do not have bleeding or cramping after 3 hours, repeat the dose and place 800mcg or 4 tablets intravaginally.    omeprazole (PRILOSEC) 40 mg, Oral, Daily    ondansetron (ZOFRAN-ODT) 4 mg, Oral, Every 6 hours PRN    oxyCODONE (ROXICODONE) 5 mg, Oral, Every 4 hours PRN       Augmentin [amoxicillin-pot clavulanate], Morphine, and Suprax [cefixime]    Exam   /72   Ht 5' 1" (1.549 m)   Wt 56.9 kg (125 lb 7.1 oz)   LMP 04/01/2023   BMI 23.70 kg/m² "     Physical Exam  Constitutional:       General: She is not in acute distress.     Appearance: Normal appearance.   HENT:      Head: Normocephalic and atraumatic.   Eyes:      Extraocular Movements: Extraocular movements intact.   Cardiovascular:      Rate and Rhythm: Normal rate.   Pulmonary:      Effort: No respiratory distress.   Neurological:      General: No focal deficit present.      Mental Status: She is alert.   Skin:     General: Skin is warm and dry.   Psychiatric:         Mood and Affect: Mood normal.         Behavior: Behavior normal.         Thought Content: Thought content normal.       Assessment and Plan   Missed     Other orders  -     Place in Outpatient; Standing  -     Vital signs; Standing  -     Ambulate Up Ad Delfina; Standing  -     Insert peripheral IV; Standing  -     LIDOcaine (PF) 10 mg/ml (1%) injection 10 mg  -     Verify beta-blocker dose taken within 24 hours if patient is prescribed beta-blocker ; Standing  -     Verify discontinuation of anti thrombotics ; Standing  -     Verify Blood Consent ; Standing  -     Verify consent; Standing  -     Verify surgical site documentation; Standing  -     Void on call to OR; Standing  -     Notify Physician/Vital Signs Parameters; Standing  -     Notify physician ; Standing  -     Diet NPO; Standing  -     Pulse Oximetry Q4H; Standing  -     IP VTE LOW RISK PATIENT; Standing      Surgery counseling  - Pain and post-operative management of pain  - Bleeding and possible management for operative or post-operative bleeding  - Infection and possible management for post-operative infections  - Surgical injury, especially to bowel, bladder, or ureters and possible operative or post-operative management  - Anesthesia  - Possible blood transfusion including risks of reaction and infection  - DVT  - Death    Patient acknowledges risks and wishes to proceed with surgery.  All questions answered.  Consents signed.    Patient instructions  - To be NPO  after midnight on the evening prior to surgery  - To arrive at the hospital 2 hours prior to her scheduled case    Ben Arora MD  OBGYN PGY-1

## 2023-06-22 NOTE — ED PROVIDER NOTES
Encounter Date: 6/21/2023       History     Chief Complaint   Patient presents with    Abdominal Pain     Diffuse lower abd pain w/ vaginal hemorrhaging.  Recent induced miscarriage and given medication this past Monday.       This is a 22-year-old female who presents for evaluation of vaginal spotting intense cramping and pain in the abdomen following an episode in which she had had a miscarriage at roughly 9 weeks gestation that subsequently required hospitalization, transfusion, and monitoring.  She had been discharged yesterday then began have intensifying pain in the suprapubic aspect with mild spotting thereafter.  She denies any fevers, chills but endorses persistent pain despite the use of Tylenol in the outpatient setting.  She is been unable to see her regular obstetrician since leaving the hospital and was concerned as she had hemorrhaged previously.    Review of patient's allergies indicates:   Allergen Reactions    Augmentin [amoxicillin-pot clavulanate] Hives    Morphine     Suprax [cefixime]      Past Medical History:   Diagnosis Date    Seizures     febrile seizure     Past Surgical History:   Procedure Laterality Date    ESOPHAGOGASTRODUODENOSCOPY  11/03/2022    ESOPHAGOGASTRODUODENOSCOPY N/A 11/3/2022    Procedure: EGD (ESOPHAGOGASTRODUODENOSCOPY);  Surgeon: Ronnie Garcia MD;  Location: [facility];  Service: Endoscopy;  Laterality: N/A;     Family History   Problem Relation Age of Onset    Heart disease Mother     Hyperlipidemia Mother     Hypertension Father     Hyperlipidemia Father      Social History     Tobacco Use    Smoking status: Never    Smokeless tobacco: Never   Substance Use Topics    Alcohol use: Yes     Comment: occasionally    Drug use: Never     Review of Systems  Constitutional-no fever  HEENT-no congestion  Eyes-no redness  Respiratory-no shortness of breath  Cardio-no chest pain  GI-no abdominal pain  Endocrine-no cold intolerance  -positive vaginal bleeding positive  vaginal cramping  MSK-no myalgias  Skin-no rashes  Allergy-no environmental allergy  Neurologic-, no headache  Hematology-no swollen nodes  Behavioral-no confusion  Physical Exam     Initial Vitals [06/21/23 2131]   BP Pulse Resp Temp SpO2   (!) 155/55 76 17 98.5 °F (36.9 °C) 99 %      MAP       --         Physical Exam  Constitutional:  Uncomfortable appearing 22-year-old female in mild distress  Eyes: Conjunctivae normal.  ENT       Head: Normocephalic, atraumatic.       Nose: Normal external appearance        Mouth/Throat: no strigulous respirations   Hematological/Lymphatic/Immunilogical: no visible lymphadenopathy   Cardiovascular: Normal rate,   Respiratory: Normal respiratory effort.   Gastrointestinal:  Soft, diffusely tender, no rebound, no guarding  Musculoskeletal: Normal range of motion in all extremities. No obvious deformities or swelling.  Neurologic: Alert, oriented. Normal speech and language. No gross focal neurologic deficits are appreciated.  Skin: Skin is warm, dry. No rash noted.  Psychiatric: Mood and affect are normal.   ED Course   Procedures  Labs Reviewed   CBC W/ AUTO DIFFERENTIAL - Abnormal; Notable for the following components:       Result Value    RBC 3.57 (*)     Hemoglobin 9.9 (*)     Hematocrit 30.8 (*)     Immature Grans (Abs) 0.06 (*)     All other components within normal limits   URINALYSIS, REFLEX TO URINE CULTURE - Abnormal; Notable for the following components:    Appearance, UA Hazy (*)     Occult Blood UA 3+ (*)     All other components within normal limits    Narrative:     Specimen Source->Urine   URINALYSIS MICROSCOPIC - Abnormal; Notable for the following components:    RBC, UA >100 (*)     All other components within normal limits    Narrative:     Specimen Source->Urine   BASIC METABOLIC PANEL   TYPE & SCREEN          Imaging Results              US OB Transvaginal (Final result)  Result time 06/21/23 23:50:31   Procedure changed from US OB <14 Wks TransAbd &  TransVag, Single Gestation (XPD)     Final result by Cherise Arriola MD (06/21/23 23:50:31)                   Impression:      Findings are concerning for hemorrhagic and retained products.      Electronically signed by: Cherise Arriola  Date:    06/21/2023  Time:    23:50               Narrative:    EXAMINATION:  ULTRASOUND OB TRANSVAGINAL    CLINICAL HISTORY:  06/18/2023    TECHNIQUE:  Real-time ultrasound of the obstetrical was performed transvaginally.    COMPARISON:  None.    FINDINGS:  The uterus measures 8.7 x 4.4 x 5.4 cm. There are no uterine masses.  Thickened heterogeneous endometrium measures up to 2.1 cm in thickness.  There is increased myometrial vascularity.  There is also some increased vascularity seen at the superior portion of the heterogeneous endometrium.  There is no fetal pole, yolk sac, or gestational sac.    The right ovary measures 1.3 x 1.9 x 1.3 cm. The left ovary measures 2.2 x 1.1 x 1.9 cm.    There is no free fluid seen in the posterior cul-de-sac.                                       Medications   ketorolac injection 9.999 mg (9.999 mg Intravenous Given 6/21/23 2829)   oxyCODONE immediate release tablet 5 mg (5 mg Oral Given 6/22/23 0206)     Medical Decision Making:   History:   Old Medical Records: I decided to obtain old medical records.  Old Records Summarized: records from clinic visits and records from previous admission(s).  Differential Diagnosis:   Hemorrhage, vaginal bleeding, dysmenorrhea, retained products of conception, anemia, urinary tract infection  Clinical Tests:   Lab Tests: Ordered and Reviewed  Radiological Study: Ordered and Reviewed  ED Management:  H&H is stable chemistries unrevealing however ultrasound is concerning for potential hemorrhagic retained products of conception.    Discussed with on-call obstetrician and gynecologist who was evaluated patient at the bedside- Dr. Carmela Schafer.   Following evaluation, discussion with patient, review of labs  and imaging they will plan for outpatient dilation and curettage.  Have discussed with patient, will plan for symptomatic management until that time, plan for discharge at this time with returning case of worsening, bleeding precautions discussed.                        Clinical Impression:   Final diagnoses:  [O03.9] Miscarriage (Primary)  [R10.30] Lower abdominal pain  [O03.4] Retained products of conception after miscarriage        ED Disposition Condition    Discharge Stable          ED Prescriptions       Medication Sig Dispense Start Date End Date Auth. Provider    oxyCODONE (ROXICODONE) 5 MG immediate release tablet Take 1 tablet (5 mg total) by mouth every 4 (four) hours as needed for Pain. 5 tablet 6/22/2023 -- Carmela Peña MD          Follow-up Information       Follow up With Specialties Details Why Contact Info    [facility] Go in 1 day For a follow up visit about today [address] [place] [id number]  [phone]             Anival Blackwood MD  06/22/23 3151

## 2023-06-22 NOTE — TELEPHONE ENCOUNTER
----- Message from Ben Arora MD sent at 6/22/2023  7:48 AM CDT -----  Regarding: Gyn clinic today  Hi Autumn,    This patient will need a pre-op appointment in gyn resident clinic today. She needs to be added on for a suction D&C tomorrow so we will need to see her today if possible.     Thanks,    Ben Arora MD  OBGYN PGY-1

## 2023-06-23 ENCOUNTER — ANESTHESIA EVENT (OUTPATIENT)
Dept: SURGERY | Facility: OTHER | Age: 23
End: 2023-06-23
Payer: MEDICAID

## 2023-06-23 ENCOUNTER — ANESTHESIA (OUTPATIENT)
Dept: SURGERY | Facility: OTHER | Age: 23
End: 2023-06-23
Payer: MEDICAID

## 2023-06-23 ENCOUNTER — HOSPITAL ENCOUNTER (OUTPATIENT)
Facility: OTHER | Age: 23
Discharge: HOME | End: 2023-06-24
Attending: OBSTETRICS & GYNECOLOGY | Admitting: STUDENT IN AN ORGANIZED HEALTH CARE EDUCATION/TRAINING PROGRAM
Payer: MEDICAID

## 2023-06-23 DIAGNOSIS — O02.1 MISSED ABORTION: ICD-10-CM

## 2023-06-23 DIAGNOSIS — Z98.890 S/P D&C (STATUS POST DILATION AND CURETTAGE): Primary | ICD-10-CM

## 2023-06-23 PROBLEM — O03.4 RETAINED PRODUCTS OF CONCEPTION AFTER MISCARRIAGE: Status: ACTIVE | Noted: 2023-06-23

## 2023-06-23 LAB
ABO + RH BLD: NORMAL
BASOPHILS # BLD AUTO: 0.02 K/UL (ref 0–0.2)
BASOPHILS # BLD AUTO: 0.03 K/UL (ref 0–0.2)
BASOPHILS NFR BLD: 0.2 % (ref 0–1.9)
BASOPHILS NFR BLD: 0.3 % (ref 0–1.9)
BLD GP AB SCN CELLS X3 SERPL QL: NORMAL
DIFFERENTIAL METHOD: ABNORMAL
DIFFERENTIAL METHOD: ABNORMAL
EOSINOPHIL # BLD AUTO: 0 K/UL (ref 0–0.5)
EOSINOPHIL # BLD AUTO: 0.2 K/UL (ref 0–0.5)
EOSINOPHIL NFR BLD: 0.2 % (ref 0–8)
EOSINOPHIL NFR BLD: 2 % (ref 0–8)
ERYTHROCYTE [DISTWIDTH] IN BLOOD BY AUTOMATED COUNT: 13.5 % (ref 11.5–14.5)
ERYTHROCYTE [DISTWIDTH] IN BLOOD BY AUTOMATED COUNT: 13.6 % (ref 11.5–14.5)
HCT VFR BLD AUTO: 29.4 % (ref 37–48.5)
HCT VFR BLD AUTO: 32.2 % (ref 37–48.5)
HGB BLD-MCNC: 10.3 G/DL (ref 12–16)
HGB BLD-MCNC: 9.5 G/DL (ref 12–16)
IMM GRANULOCYTES # BLD AUTO: 0.03 K/UL (ref 0–0.04)
IMM GRANULOCYTES # BLD AUTO: 0.03 K/UL (ref 0–0.04)
IMM GRANULOCYTES NFR BLD AUTO: 0.3 % (ref 0–0.5)
IMM GRANULOCYTES NFR BLD AUTO: 0.3 % (ref 0–0.5)
LYMPHOCYTES # BLD AUTO: 1.3 K/UL (ref 1–4.8)
LYMPHOCYTES # BLD AUTO: 2.7 K/UL (ref 1–4.8)
LYMPHOCYTES NFR BLD: 12 % (ref 18–48)
LYMPHOCYTES NFR BLD: 26.2 % (ref 18–48)
MCH RBC QN AUTO: 28.1 PG (ref 27–31)
MCH RBC QN AUTO: 28.3 PG (ref 27–31)
MCHC RBC AUTO-ENTMCNC: 32 G/DL (ref 32–36)
MCHC RBC AUTO-ENTMCNC: 32.3 G/DL (ref 32–36)
MCV RBC AUTO: 88 FL (ref 82–98)
MCV RBC AUTO: 88 FL (ref 82–98)
MONOCYTES # BLD AUTO: 0.2 K/UL (ref 0.3–1)
MONOCYTES # BLD AUTO: 0.7 K/UL (ref 0.3–1)
MONOCYTES NFR BLD: 1.9 % (ref 4–15)
MONOCYTES NFR BLD: 6.6 % (ref 4–15)
NEUTROPHILS # BLD AUTO: 6.6 K/UL (ref 1.8–7.7)
NEUTROPHILS # BLD AUTO: 9.5 K/UL (ref 1.8–7.7)
NEUTROPHILS NFR BLD: 64.6 % (ref 38–73)
NEUTROPHILS NFR BLD: 85.4 % (ref 38–73)
NRBC BLD-RTO: 0 /100 WBC
NRBC BLD-RTO: 0 /100 WBC
PLATELET # BLD AUTO: 238 K/UL (ref 150–450)
PLATELET # BLD AUTO: 256 K/UL (ref 150–450)
PMV BLD AUTO: 8.7 FL (ref 9.2–12.9)
PMV BLD AUTO: 8.9 FL (ref 9.2–12.9)
RBC # BLD AUTO: 3.36 M/UL (ref 4–5.4)
RBC # BLD AUTO: 3.66 M/UL (ref 4–5.4)
SPECIMEN OUTDATE: NORMAL
WBC # BLD AUTO: 10.29 K/UL (ref 3.9–12.7)
WBC # BLD AUTO: 11.15 K/UL (ref 3.9–12.7)

## 2023-06-23 PROCEDURE — 36000705 HC OR TIME LEV I EA ADD 15 MIN: Performed by: OBSTETRICS & GYNECOLOGY

## 2023-06-23 PROCEDURE — 63600175 PHARM REV CODE 636 W HCPCS: Performed by: STUDENT IN AN ORGANIZED HEALTH CARE EDUCATION/TRAINING PROGRAM

## 2023-06-23 PROCEDURE — 59812 PR SURG RX INCOMPLETE ABORTN: ICD-10-PCS | Mod: ,,, | Performed by: OBSTETRICS & GYNECOLOGY

## 2023-06-23 PROCEDURE — 88305 TISSUE EXAM BY PATHOLOGIST: ICD-10-PCS | Mod: 26,,, | Performed by: PATHOLOGY

## 2023-06-23 PROCEDURE — 71000033 HC RECOVERY, INTIAL HOUR: Performed by: OBSTETRICS & GYNECOLOGY

## 2023-06-23 PROCEDURE — 63600175 PHARM REV CODE 636 W HCPCS: Performed by: NURSE ANESTHETIST, CERTIFIED REGISTERED

## 2023-06-23 PROCEDURE — 63600175 PHARM REV CODE 636 W HCPCS: Performed by: ANESTHESIOLOGY

## 2023-06-23 PROCEDURE — 37000009 HC ANESTHESIA EA ADD 15 MINS: Performed by: OBSTETRICS & GYNECOLOGY

## 2023-06-23 PROCEDURE — 71000015 HC POSTOP RECOV 1ST HR: Performed by: OBSTETRICS & GYNECOLOGY

## 2023-06-23 PROCEDURE — D9220A PRA ANESTHESIA: Mod: QY,ANES,, | Performed by: ANESTHESIOLOGY

## 2023-06-23 PROCEDURE — 37000008 HC ANESTHESIA 1ST 15 MINUTES: Performed by: OBSTETRICS & GYNECOLOGY

## 2023-06-23 PROCEDURE — D9220A PRA ANESTHESIA: ICD-10-PCS | Mod: QX,CRNA,, | Performed by: STUDENT IN AN ORGANIZED HEALTH CARE EDUCATION/TRAINING PROGRAM

## 2023-06-23 PROCEDURE — 25000003 PHARM REV CODE 250: Performed by: STUDENT IN AN ORGANIZED HEALTH CARE EDUCATION/TRAINING PROGRAM

## 2023-06-23 PROCEDURE — 71000016 HC POSTOP RECOV ADDL HR: Performed by: OBSTETRICS & GYNECOLOGY

## 2023-06-23 PROCEDURE — D9220A PRA ANESTHESIA: Mod: QX,CRNA,, | Performed by: STUDENT IN AN ORGANIZED HEALTH CARE EDUCATION/TRAINING PROGRAM

## 2023-06-23 PROCEDURE — 01965 ANES INCOMPL/MISSED AB PX: CPT | Performed by: OBSTETRICS & GYNECOLOGY

## 2023-06-23 PROCEDURE — 25000003 PHARM REV CODE 250: Performed by: ANESTHESIOLOGY

## 2023-06-23 PROCEDURE — 85025 COMPLETE CBC W/AUTO DIFF WBC: CPT | Performed by: OBSTETRICS & GYNECOLOGY

## 2023-06-23 PROCEDURE — 71000039 HC RECOVERY, EACH ADD'L HOUR: Performed by: OBSTETRICS & GYNECOLOGY

## 2023-06-23 PROCEDURE — 88305 TISSUE EXAM BY PATHOLOGIST: CPT | Performed by: PATHOLOGY

## 2023-06-23 PROCEDURE — D9220A PRA ANESTHESIA: ICD-10-PCS | Mod: QY,ANES,, | Performed by: ANESTHESIOLOGY

## 2023-06-23 PROCEDURE — 86900 BLOOD TYPING SEROLOGIC ABO: CPT | Performed by: OBSTETRICS & GYNECOLOGY

## 2023-06-23 PROCEDURE — 36000704 HC OR TIME LEV I 1ST 15 MIN: Performed by: OBSTETRICS & GYNECOLOGY

## 2023-06-23 PROCEDURE — 59812 TREATMENT OF MISCARRIAGE: CPT | Mod: ,,, | Performed by: OBSTETRICS & GYNECOLOGY

## 2023-06-23 PROCEDURE — 36415 COLL VENOUS BLD VENIPUNCTURE: CPT | Performed by: OBSTETRICS & GYNECOLOGY

## 2023-06-23 PROCEDURE — 88305 TISSUE EXAM BY PATHOLOGIST: CPT | Mod: 26,,, | Performed by: PATHOLOGY

## 2023-06-23 RX ORDER — MEPERIDINE HYDROCHLORIDE 25 MG/ML
12.5 INJECTION INTRAMUSCULAR; INTRAVENOUS; SUBCUTANEOUS ONCE AS NEEDED
Status: DISCONTINUED | OUTPATIENT
Start: 2023-06-23 | End: 2023-06-23 | Stop reason: HOSPADM

## 2023-06-23 RX ORDER — FENTANYL CITRATE 50 UG/ML
INJECTION, SOLUTION INTRAMUSCULAR; INTRAVENOUS
Status: DISCONTINUED | OUTPATIENT
Start: 2023-06-23 | End: 2023-06-23

## 2023-06-23 RX ORDER — IBUPROFEN 600 MG/1
600 TABLET ORAL EVERY 6 HOURS PRN
Qty: 30 TABLET | Refills: 0 | Status: SHIPPED | OUTPATIENT
Start: 2023-06-23 | End: 2023-09-29

## 2023-06-23 RX ORDER — PROCHLORPERAZINE EDISYLATE 5 MG/ML
5 INJECTION INTRAMUSCULAR; INTRAVENOUS EVERY 30 MIN PRN
Status: DISCONTINUED | OUTPATIENT
Start: 2023-06-23 | End: 2023-06-23 | Stop reason: HOSPADM

## 2023-06-23 RX ORDER — HYDROCODONE BITARTRATE AND ACETAMINOPHEN 5; 325 MG/1; MG/1
1 TABLET ORAL EVERY 4 HOURS PRN
Qty: 6 TABLET | Refills: 0 | Status: SHIPPED | OUTPATIENT
Start: 2023-06-23 | End: 2023-09-29

## 2023-06-23 RX ORDER — LIDOCAINE HYDROCHLORIDE 10 MG/ML
1 INJECTION, SOLUTION EPIDURAL; INFILTRATION; INTRACAUDAL; PERINEURAL ONCE
Status: DISCONTINUED | OUTPATIENT
Start: 2023-06-23 | End: 2023-06-23 | Stop reason: HOSPADM

## 2023-06-23 RX ORDER — ONDANSETRON HYDROCHLORIDE 2 MG/ML
INJECTION, SOLUTION INTRAMUSCULAR; INTRAVENOUS
Status: DISCONTINUED | OUTPATIENT
Start: 2023-06-23 | End: 2023-06-23

## 2023-06-23 RX ORDER — SODIUM CHLORIDE 0.9 % (FLUSH) 0.9 %
3 SYRINGE (ML) INJECTION
Status: DISCONTINUED | OUTPATIENT
Start: 2023-06-23 | End: 2023-06-23 | Stop reason: HOSPADM

## 2023-06-23 RX ORDER — HYDROMORPHONE HYDROCHLORIDE 2 MG/ML
0.4 INJECTION, SOLUTION INTRAMUSCULAR; INTRAVENOUS; SUBCUTANEOUS EVERY 5 MIN PRN
Status: DISCONTINUED | OUTPATIENT
Start: 2023-06-23 | End: 2023-06-23 | Stop reason: HOSPADM

## 2023-06-23 RX ORDER — DEXAMETHASONE SODIUM PHOSPHATE 4 MG/ML
INJECTION, SOLUTION INTRA-ARTICULAR; INTRALESIONAL; INTRAMUSCULAR; INTRAVENOUS; SOFT TISSUE
Status: DISCONTINUED | OUTPATIENT
Start: 2023-06-23 | End: 2023-06-23

## 2023-06-23 RX ORDER — OXYCODONE HYDROCHLORIDE 5 MG/1
5 TABLET ORAL
Status: DISCONTINUED | OUTPATIENT
Start: 2023-06-23 | End: 2023-06-23 | Stop reason: HOSPADM

## 2023-06-23 RX ORDER — PROPOFOL 10 MG/ML
VIAL (ML) INTRAVENOUS
Status: DISCONTINUED | OUTPATIENT
Start: 2023-06-23 | End: 2023-06-23

## 2023-06-23 RX ORDER — ACETAMINOPHEN 500 MG
1000 TABLET ORAL ONCE
Status: COMPLETED | OUTPATIENT
Start: 2023-06-23 | End: 2023-06-23

## 2023-06-23 RX ORDER — MIDAZOLAM HYDROCHLORIDE 1 MG/ML
INJECTION INTRAMUSCULAR; INTRAVENOUS
Status: DISCONTINUED | OUTPATIENT
Start: 2023-06-23 | End: 2023-06-23

## 2023-06-23 RX ORDER — LIDOCAINE HYDROCHLORIDE 20 MG/ML
INJECTION INTRAVENOUS
Status: DISCONTINUED | OUTPATIENT
Start: 2023-06-23 | End: 2023-06-23

## 2023-06-23 RX ADMIN — ONDANSETRON 4 MG: 2 INJECTION INTRAMUSCULAR; INTRAVENOUS at 03:06

## 2023-06-23 RX ADMIN — DEXAMETHASONE SODIUM PHOSPHATE 8 MG: 4 INJECTION, SOLUTION INTRAMUSCULAR; INTRAVENOUS at 03:06

## 2023-06-23 RX ADMIN — DOXYCYCLINE 200 MG: 100 INJECTION, POWDER, LYOPHILIZED, FOR SOLUTION INTRAVENOUS at 03:06

## 2023-06-23 RX ADMIN — ACETAMINOPHEN 1000 MG: 500 TABLET, FILM COATED ORAL at 01:06

## 2023-06-23 RX ADMIN — SODIUM CHLORIDE, SODIUM LACTATE, POTASSIUM CHLORIDE, AND CALCIUM CHLORIDE: .6; .31; .03; .02 INJECTION, SOLUTION INTRAVENOUS at 03:06

## 2023-06-23 RX ADMIN — PROPOFOL 160 MG: 10 INJECTION, EMULSION INTRAVENOUS at 03:06

## 2023-06-23 RX ADMIN — HYDROMORPHONE HYDROCHLORIDE 0.4 MG: 2 INJECTION INTRAMUSCULAR; INTRAVENOUS; SUBCUTANEOUS at 04:06

## 2023-06-23 RX ADMIN — LIDOCAINE HYDROCHLORIDE 100 MG: 20 INJECTION, SOLUTION INTRAVENOUS at 03:06

## 2023-06-23 RX ADMIN — MIDAZOLAM HYDROCHLORIDE 2 MG: 1 INJECTION, SOLUTION INTRAMUSCULAR; INTRAVENOUS at 03:06

## 2023-06-23 RX ADMIN — FENTANYL CITRATE 100 MCG: 50 INJECTION, SOLUTION INTRAMUSCULAR; INTRAVENOUS at 03:06

## 2023-06-23 NOTE — ANESTHESIA POSTPROCEDURE EVALUATION
Anesthesia Post Evaluation    Patient: Inessa Salgado    Procedure(s) Performed: Procedure(s) (LRB):  DILATION AND CURETTAGE, UTERUS, USING SUCTION (N/A)    Final Anesthesia Type: general      Patient location during evaluation: PACU  Patient participation: Yes- Able to Participate  Level of consciousness: awake and alert  Post-procedure vital signs: reviewed and stable  Pain management: adequate  Airway patency: patent    PONV status at discharge: No PONV  Anesthetic complications: no      Cardiovascular status: blood pressure returned to baseline  Respiratory status: unassisted and spontaneous ventilation  Hydration status: euvolemic  Follow-up not needed.          Vitals Value Taken Time   /55 06/23/23 1647   Temp 36.3 °C (97.3 °F) 06/23/23 1632   Pulse 70 06/23/23 1656   Resp 16 06/23/23 1645   SpO2 97 % 06/23/23 1656   Vitals shown include unvalidated device data.      No case tracking events are documented in the log.      Pain/Sussy Score: Pain Rating Prior to Med Admin: 7 (6/23/2023  4:47 PM)  Sussy Score: 9 (6/23/2023  4:47 PM)

## 2023-06-23 NOTE — OP NOTE
OPERATIVE NOTE    DATE OF PROCEDURE: 06/23/2023    SURGEON: Yolanda Florian MD     ASSISTANT: Ben Arora MD - PGY1    PREOPERATIVE DIAGNOSIS: Retained products of conception    POSTOPERATIVE DIAGNOSIS:    S/p suction dilation and curettage     PROCEDURE:  Suction dilation & curettage     ANESTHESIA: General    FINDINGS:   1. Normal appearing external female genitalia, vaginal and cervical mucosa  2. Cervix descends to within 2 cm of the introitus with traction if hysterectomy is indicated in the future  3. Uterus sounded to 8 cm, dilated to easily accommodate a # 8 suction tip.   5. Suction tip inserted and applied to all surfaces of the uterine cavity until no significant products of conception were seen in the tip or tubing  6. Specimen collected and sent to pathology  7. Tenaculum puncture sites hemostatic at the conclusion of the procedure      ESTIMATED BLOOD LOSS: 150 mL    URINE OUTPUT: 100 mL    IV FLUIDS: 600 mL    INDICATIONS: Retained products of conception      PROCEDURE IN DETAIL:   After proper consents were explained and obtained, patient was taken to the operating room where general anesthesia was administered and found to be adequate.  She was placed in the dorsal lithotomy position using Yellowfin stirrups, then prepped and draped in the usual sterile fashion. Bladder was drained via in-and-out catheterization prior to procedure. A surgical timeout was performed with patient's name, date of birth, procedure to be performed, and allergies verbalized. All OR staff in agreement. 200 mg of IV doxycycline was administered.     Attention was then turned to the vagina where a right angle retractor was placed in the posterior aspect, and a right angle retractor was placed in the anterior aspect.  The anterior lip of the cervix was grasped with a single tooth tenaculum.  The uterus was sounded to approximately 8 cm. The cervix was serially dilated using Jaison dilators to easily accommodate a #8  suction tip. The suction tip was applied to each surface of the uterine cavity until no significant products of conception were seen in the tip or tubing. The tip was removed from the uterus and the scrapings were collected and sent to pathology. The uterus was then curetted in a clockwise fashion until gritty feeling was noted in all aspects of the uterus. The endometrial scrapings were also sent to pathology. The tenaculum was removed and hemostasis was noted at the puncture sites in the cervix. 800 mcg of Cytotec was given rectally for uterine bleeding. Hemostasis was noted.     The patient tolerated the procedure well.  All counts were correct x 2. The patient was taken to recovery area in stable condition.      Ben Arora MD  PGY-1 OBGYN      I was present and supervised the entire procedure.  I agree with the resident's operative note.         Yolanda Florian MD  06/27/2023

## 2023-06-23 NOTE — DISCHARGE SUMMARY
[facility]  Brief Op Note/Discharge Note  Short Stay    Admit Date: 6/23/2023    Discharge Date: 06/23/2023    Attending Physician: Yolanda Florian MD     Surgery Date: 6/23/2023     Surgeon(s) and Role:     * Yolanda Florian MD - Primary    Assisting Surgeon: Ben Arora MD PGY-1    Pre-op Diagnosis:  Retained products of conception after miscarriage [O03.4]    Post-op Diagnosis:  Post-Op Diagnosis Codes:     * Retained products of conception after miscarriage [O03.4]    Procedure(s) (LRB):  DILATION AND CURETTAGE, UTERUS, USING SUCTION (N/A)    Anesthesia: General    Findings/Key Components: As noted in operative report    Estimated Blood Loss: As noted in operative report          Specimens:   Specimen (24h ago, onward)       Start     Ordered    06/23/23 1600  Specimen to Pathology, Surgery Gynecology and Obstetrics  Once        Comments: Pre-op Diagnosis: Retained products of conception after miscarriage [O03.4]Procedure(s):DILATION AND CURETTAGE, UTERUS, USING SUCTION Number of specimens: 1Name of specimens: Products of conception     References:    Click here for ordering Quick Tip   Question Answer Comment   Procedure Type: Gynecology and Obstetrics    Specimen Class: Routine/Screening    Which provider would you like to cc? YOLANDA FLORIAN    Release to patient Immediate        06/23/23 1601                    Discharge Provider: Keren Coon    Diagnoses:  Active Hospital Problems    Diagnosis  POA    *S/P D&C (status post dilation and curettage) [Z98.890]  Not Applicable    Retained products of conception after miscarriage [O03.4]  Yes      Resolved Hospital Problems   No resolved problems to display.       Discharged Condition: good    Hospital Course:   Patient was admitted for outpatient procedure as above, and tolerated the procedure well with no complications. Please see operative report for further details. Following the procedure, the patient was awakened from anesthesia and  transferred to the recovery area in stable condition. She was discharged to home once ambulating, voiding, tolerating PO intake, and pain was well-controlled. Patient was given routine post-op instructions and prescriptions for pain medication to take as needed. Patient instructed to follow up with [facility] in [facility] weeks.    Final Diagnoses: Same as principal problem.    Disposition: Home or Self Care    Follow up/Patient Instructions:    Medications:  Reconciled Home Medications:      Medication List        CHANGE how you take these medications      * HYDROcodone-acetaminophen 5-325 mg per tablet  Commonly known as: NORCO  Take 1 tablet by mouth every 4 (four) hours as needed for Pain.  What changed: Another medication with the same name was added. Make sure you understand how and when to take each.     * HYDROcodone-acetaminophen 5-325 mg per tablet  Commonly known as: NORCO  Take 1 tablet by mouth every 4 (four) hours as needed for Pain.  What changed: You were already taking a medication with the same name, and this prescription was added. Make sure you understand how and when to take each.     * ibuprofen 800 MG tablet  Commonly known as: ADVIL,MOTRIN  Take 1 tablet (800 mg total) by mouth every 6 (six) hours as needed for Pain.  What changed: Another medication with the same name was added. Make sure you understand how and when to take each.     * ibuprofen 600 MG tablet  Commonly known as: ADVIL,MOTRIN  Take 1 tablet (600 mg total) by mouth every 6 (six) hours as needed for Pain.  What changed: You were already taking a medication with the same name, and this prescription was added. Make sure you understand how and when to take each.           * This list has 4 medication(s) that are the same as other medications prescribed for you. Read the directions carefully, and ask your doctor or other care provider to review them with you.                CONTINUE taking these medications       acetaminophen 325 MG tablet  Commonly known as: TYLENOL  Take 2 tablets (650 mg total) by mouth every 6 (six) hours. Alternate between ibuprofen and tylenol every 3 hours. For example: @0800: ibuprofen 600mg @1100: tylenol 650mg @1400: ibuprofen 600mg @1700: tylenol 650 mg @2000: ibuprofen 600mg for 7 days     BALCOLTRA 0.1 mg-0.02 mg (21)/36.5 mg(7) Tab  Generic drug: levonorgest-eth.estradioL-iron  Take 1 tablet by mouth once daily.     benzoyl peroxide 5 % external liquid  WASH FACE EVERY DAY AS DIRECTED     FeroSuL 325 mg (65 mg iron) Tab tablet  Generic drug: ferrous sulfate  Take 1 tablet (325 mg total) by mouth As instructed (Every other day).     miSOPROStoL 200 MCG Tab  Commonly known as: CYTOTEC  Place 4 tablets (800 mcg total) vaginally every 3 (three) hours as needed (Repeat dose after 3 hours if no cramping or vaginal bleeding occurs). If you do not have bleeding or cramping after 3 hours, repeat the dose and place 800mcg or 4 tablets intravaginally.     ondansetron 4 MG Tbdl  Commonly known as: ZOFRAN-ODT  Take 1 tablet (4 mg total) by mouth every 6 (six) hours as needed (Nausea and vomiting).     oxyCODONE 5 MG immediate release tablet  Commonly known as: ROXICODONE  Take 1 tablet (5 mg total) by mouth every 4 (four) hours as needed for Pain.            ASK your doctor about these medications      omeprazole 40 MG capsule  Commonly known as: PRILOSEC  Take 1 capsule (40 mg total) by mouth once daily.            Discharge Procedure Orders   Diet Adult Regular     Lifting restrictions   Order Comments: No lifting > 10 lbs until postoperative appointment     No driving until:   Order Comments: No longer taking narcotics. Able to safely hit brakes without pain.     Pelvic Rest   Order Comments: Nothing inside the vagina for at least 2 weeks after surgery     No dressing needed     Notify your health care provider if you experience any of the following:  temperature >100.4     Notify your health care  provider if you experience any of the following:  persistent nausea and vomiting or diarrhea     Notify your health care provider if you experience any of the following:  severe uncontrolled pain     Notify your health care provider if you experience any of the following:  redness, tenderness, or signs of infection (pain, swelling, redness, odor or green/yellow discharge around incision site)     Notify your health care provider if you experience any of the following:  difficulty breathing or increased cough     Notify your health care provider if you experience any of the following:  severe persistent headache     Notify your health care provider if you experience any of the following:  worsening rash     Notify your health care provider if you experience any of the following:  persistent dizziness, light-headedness, or visual disturbances     Notify your health care provider if you experience any of the following:  increased confusion or weakness     Notify your health care provider if you experience any of the following:   Order Comments: Vaginal bleeding saturating more than one pad per hour for >2 hours     Activity as tolerated      Follow-up Information       [facility]. Schedule an appointment as soon as possible for a visit in 4 week(s).    Why: Postop Appointment                           Keren Coon MD   PGY-3, OB-GYN

## 2023-06-23 NOTE — ANESTHESIA PROCEDURE NOTES
Intubation    Date/Time: 6/23/2023 3:47 PM  Performed by: Derick Novak CRNA  Authorized by: Harley Layne MD     Intubation:     Induction:  Intravenous    Intubated:  Postinduction    Mask Ventilation:  Not attempted    Attempts:  1    Attempted By:  CRNA    Difficult Airway Encountered?: No      Complications:  None    Airway Device:  Supraglottic airway/LMA    Airway Device Size:  3.0    Placement Verified By:  Capnometry    Complicating Factors:  None    Findings Post-Intubation:  BS equal bilateral and atraumatic/condition of teeth unchanged

## 2023-06-23 NOTE — ANESTHESIA PREPROCEDURE EVALUATION
06/23/2023  Inessa Salgado is a 22 y.o., female.      Pre-op Assessment    I have reviewed the Patient Summary Reports.     I have reviewed the Nursing Notes.    I have reviewed the Medications.     Review of Systems  Anesthesia Hx:  No problems with previous Anesthesia  Denies Family Hx of Anesthesia complications.   Denies Personal Hx of Anesthesia complications.   Social:  Non-Smoker    Hematology/Oncology:  Hematology Normal   Oncology Normal     EENT/Dental:EENT/Dental Normal   Cardiovascular:  Cardiovascular Normal Exercise tolerance: good     Pulmonary:  Pulmonary Normal    Renal/:  Renal/ Normal     Musculoskeletal:  Musculoskeletal Normal    Neurological:  Neurology Normal    Endocrine:  Endocrine Normal    Dermatological:  Skin Normal    Psych:  Psychiatric Normal           Physical Exam  General: Well nourished, Cooperative, Oriented and Alert    Airway:  Mouth Opening: Normal  TM Distance: Normal  Neck ROM: Normal ROM    Dental:  Intact        Anesthesia Plan  Type of Anesthesia, risks & benefits discussed:    Anesthesia Type: Gen Supraglottic Airway  Intra-op Monitoring Plan: Standard ASA Monitors  Post Op Pain Control Plan: multimodal analgesia  Induction:  IV  Airway Plan: Video and Direct  Informed Consent: Informed consent signed with the Patient and all parties understand the risks and agree with anesthesia plan.  All questions answered.   ASA Score: 2  Day of Surgery Review of History & Physical: H&P Update referred to the surgeon/provider.    Ready For Surgery From Anesthesia Perspective.     .

## 2023-06-23 NOTE — TRANSFER OF CARE
"Anesthesia Transfer of Care Note    Patient: Inessa Salgado    Procedure(s) Performed: Procedure(s) (LRB):  DILATION AND CURETTAGE, UTERUS, USING SUCTION (N/A)    Patient location: PACU    Anesthesia Type: general    Transport from OR: Transported from OR on 2-3 L/min O2 by NC with adequate spontaneous ventilation    Post pain: adequate analgesia    Post assessment: no apparent anesthetic complications and tolerated procedure well    Post vital signs: stable    Level of consciousness: responds to stimulation    Nausea/Vomiting: no nausea/vomiting    Complications: none    Transfer of care protocol was followed      Last vitals:   Visit Vitals  BP (!) 107/55 (BP Location: Right arm, Patient Position: Lying)   Pulse 73   Temp 36.3 °C (97.3 °F) (Temporal)   Resp 16   Ht 5' 1" (1.549 m)   Wt 56.7 kg (125 lb)   LMP 04/01/2023   SpO2 100%   Breastfeeding No   BMI 23.62 kg/m²     "

## 2023-06-24 LAB
BASOPHILS # BLD AUTO: 0.01 K/UL (ref 0–0.2)
BASOPHILS NFR BLD: 0.1 % (ref 0–1.9)
DIFFERENTIAL METHOD: ABNORMAL
EOSINOPHIL # BLD AUTO: 0 K/UL (ref 0–0.5)
EOSINOPHIL NFR BLD: 0 % (ref 0–8)
ERYTHROCYTE [DISTWIDTH] IN BLOOD BY AUTOMATED COUNT: 13.2 % (ref 11.5–14.5)
HCT VFR BLD AUTO: 28.9 % (ref 37–48.5)
HGB BLD-MCNC: 9.3 G/DL (ref 12–16)
IMM GRANULOCYTES # BLD AUTO: 0.06 K/UL (ref 0–0.04)
IMM GRANULOCYTES NFR BLD AUTO: 0.6 % (ref 0–0.5)
LYMPHOCYTES # BLD AUTO: 1.3 K/UL (ref 1–4.8)
LYMPHOCYTES NFR BLD: 12.1 % (ref 18–48)
MCH RBC QN AUTO: 27.8 PG (ref 27–31)
MCHC RBC AUTO-ENTMCNC: 32.2 G/DL (ref 32–36)
MCV RBC AUTO: 87 FL (ref 82–98)
MONOCYTES # BLD AUTO: 0.5 K/UL (ref 0.3–1)
MONOCYTES NFR BLD: 4.6 % (ref 4–15)
NEUTROPHILS # BLD AUTO: 8.8 K/UL (ref 1.8–7.7)
NEUTROPHILS NFR BLD: 82.6 % (ref 38–73)
NRBC BLD-RTO: 0 /100 WBC
PLATELET # BLD AUTO: 274 K/UL (ref 150–450)
PMV BLD AUTO: 9.2 FL (ref 9.2–12.9)
RBC # BLD AUTO: 3.34 M/UL (ref 4–5.4)
WBC # BLD AUTO: 10.69 K/UL (ref 3.9–12.7)

## 2023-06-24 PROCEDURE — 25000003 PHARM REV CODE 250: Performed by: STUDENT IN AN ORGANIZED HEALTH CARE EDUCATION/TRAINING PROGRAM

## 2023-06-24 PROCEDURE — 85025 COMPLETE CBC W/AUTO DIFF WBC: CPT | Performed by: STUDENT IN AN ORGANIZED HEALTH CARE EDUCATION/TRAINING PROGRAM

## 2023-06-24 PROCEDURE — 36415 COLL VENOUS BLD VENIPUNCTURE: CPT | Performed by: STUDENT IN AN ORGANIZED HEALTH CARE EDUCATION/TRAINING PROGRAM

## 2023-06-24 RX ORDER — SUCRALFATE 1 G/10ML
1 SUSPENSION ORAL EVERY 6 HOURS
Status: DISCONTINUED | OUTPATIENT
Start: 2023-06-24 | End: 2023-06-24 | Stop reason: HOSPADM

## 2023-06-24 RX ORDER — SIMETHICONE 80 MG
1 TABLET,CHEWABLE ORAL 3 TIMES DAILY PRN
Status: DISCONTINUED | OUTPATIENT
Start: 2023-06-24 | End: 2023-06-24 | Stop reason: HOSPADM

## 2023-06-24 RX ORDER — MAG HYDROX/ALUMINUM HYD/SIMETH 200-200-20
30 SUSPENSION, ORAL (FINAL DOSE FORM) ORAL
Status: DISCONTINUED | OUTPATIENT
Start: 2023-06-24 | End: 2023-06-24 | Stop reason: HOSPADM

## 2023-06-24 RX ORDER — TALC
6 POWDER (GRAM) TOPICAL NIGHTLY PRN
Status: DISCONTINUED | OUTPATIENT
Start: 2023-06-24 | End: 2023-06-24 | Stop reason: HOSPADM

## 2023-06-24 RX ORDER — PROCHLORPERAZINE EDISYLATE 5 MG/ML
5 INJECTION INTRAMUSCULAR; INTRAVENOUS EVERY 6 HOURS PRN
Status: DISCONTINUED | OUTPATIENT
Start: 2023-06-24 | End: 2023-06-24 | Stop reason: HOSPADM

## 2023-06-24 RX ORDER — ONDANSETRON 8 MG/1
8 TABLET, ORALLY DISINTEGRATING ORAL EVERY 8 HOURS PRN
Status: DISCONTINUED | OUTPATIENT
Start: 2023-06-24 | End: 2023-06-24 | Stop reason: HOSPADM

## 2023-06-24 RX ORDER — OXYCODONE HYDROCHLORIDE 5 MG/1
10 TABLET ORAL EVERY 6 HOURS PRN
Status: DISCONTINUED | OUTPATIENT
Start: 2023-06-24 | End: 2023-06-24 | Stop reason: HOSPADM

## 2023-06-24 RX ORDER — IBUPROFEN 600 MG/1
600 TABLET ORAL EVERY 6 HOURS PRN
Status: DISCONTINUED | OUTPATIENT
Start: 2023-06-24 | End: 2023-06-24 | Stop reason: HOSPADM

## 2023-06-24 RX ORDER — SODIUM CHLORIDE 0.9 % (FLUSH) 0.9 %
10 SYRINGE (ML) INJECTION
Status: DISCONTINUED | OUTPATIENT
Start: 2023-06-24 | End: 2023-06-24 | Stop reason: HOSPADM

## 2023-06-24 RX ORDER — ACETAMINOPHEN 325 MG/1
650 TABLET ORAL EVERY 6 HOURS PRN
Status: DISCONTINUED | OUTPATIENT
Start: 2023-06-24 | End: 2023-06-24 | Stop reason: HOSPADM

## 2023-06-24 RX ORDER — DIPHENHYDRAMINE HCL 25 MG
25 CAPSULE ORAL EVERY 6 HOURS PRN
Status: DISCONTINUED | OUTPATIENT
Start: 2023-06-24 | End: 2023-06-24 | Stop reason: HOSPADM

## 2023-06-24 RX ORDER — CALCIUM CARBONATE 200(500)MG
500 TABLET,CHEWABLE ORAL 2 TIMES DAILY PRN
Status: DISCONTINUED | OUTPATIENT
Start: 2023-06-24 | End: 2023-06-24 | Stop reason: HOSPADM

## 2023-06-24 RX ORDER — LANOLIN ALCOHOL/MO/W.PET/CERES
1 CREAM (GRAM) TOPICAL DAILY
Status: DISCONTINUED | OUTPATIENT
Start: 2023-06-24 | End: 2023-06-24 | Stop reason: HOSPADM

## 2023-06-24 RX ORDER — OXYCODONE HYDROCHLORIDE 5 MG/1
5 TABLET ORAL EVERY 4 HOURS PRN
Status: DISCONTINUED | OUTPATIENT
Start: 2023-06-24 | End: 2023-06-24 | Stop reason: HOSPADM

## 2023-06-24 RX ORDER — FAMOTIDINE 20 MG/1
20 TABLET, FILM COATED ORAL 2 TIMES DAILY PRN
Status: DISCONTINUED | OUTPATIENT
Start: 2023-06-24 | End: 2023-06-24 | Stop reason: HOSPADM

## 2023-06-24 RX ADMIN — FERROUS SULFATE TAB 325 MG (65 MG ELEMENTAL FE) 1 EACH: 325 (65 FE) TAB at 09:06

## 2023-06-24 RX ADMIN — ALUMINUM HYDROXIDE, MAGNESIUM HYDROXIDE, AND SIMETHICONE 30 ML: 200; 200; 20 SUSPENSION ORAL at 06:06

## 2023-06-24 RX ADMIN — SUCRALFATE 1 G: 1 SUSPENSION ORAL at 06:06

## 2023-06-24 NOTE — DISCHARGE SUMMARY
Discharge Summary  Gynecology      Admit Date: 6/23/2023    Discharge Date and Time: 6/24/2023     Attending Physician: Katherine C Boecking, MD    Principal Diagnoses: S/P D&C (status post dilation and curettage)    Active Hospital Problems    Diagnosis  POA    *S/P D&C (status post dilation and curettage) [Z98.890]  Not Applicable    Retained products of conception after miscarriage [O03.4]  Yes      Resolved Hospital Problems   No resolved problems to display.       Procedures: Procedure(s) (LRB):  DILATION AND CURETTAGE, UTERUS, USING SUCTION (N/A)    Discharged Condition: good    Hospital Course:   Inessa Salgado is a 22 y.o. y.o. No obstetric history on file. female who presented on 6/23/2023   for above procedures for the treatment of retained POC. Patient tolerated procedure. Post-operative course was complicated by weakness and decision was made to admit overnight for observation. Serial CBC's stable, H/H > 10/32 > 9.3/29 > 9.3/28.9.   On day of discharge, patient was urinating, ambulating, and tolerating a regular diet without difficulty. Pain was well controlled on PO medication. Strict precautions given. She was discharged home on POD#1 in stable        Significant Diagnostic Studies:  Recent Labs   Lab 06/23/23  1326 06/23/23  1902 06/24/23  0413   WBC 10.29 11.15 10.69   HGB 10.3* 9.5* 9.3*   HCT 32.2* 29.4* 28.9*   MCV 88 88 87    238 274          Disposition: Home or Self Care    Patient Instructions:   Current Discharge Medication List        START taking these medications    Details   !! HYDROcodone-acetaminophen (NORCO) 5-325 mg per tablet Take 1 tablet by mouth every 4 (four) hours as needed for Pain.  Qty: 6 tablet, Refills: 0    Comments: Quantity prescribed more than 7 day supply? No      !! ibuprofen (ADVIL,MOTRIN) 600 MG tablet Take 1 tablet (600 mg total) by mouth every 6 (six) hours as needed for Pain.  Qty: 30 tablet, Refills: 0       !! - Potential duplicate medications found.  Please discuss with provider.        CONTINUE these medications which have NOT CHANGED    Details   acetaminophen (TYLENOL) 325 MG tablet Take 2 tablets (650 mg total) by mouth every 6 (six) hours. Alternate between ibuprofen and tylenol every 3 hours. For example: @0800: ibuprofen 600mg @1100: tylenol 650mg @1400: ibuprofen 600mg @1700: tylenol 650 mg @2000: ibuprofen 600mg for 7 days  Qty: 56 tablet, Refills: 0      ferrous sulfate (IRON) 325 mg (65 mg iron) Tab tablet Take 1 tablet (325 mg total) by mouth As instructed (Every other day).  Qty: 30 tablet, Refills: 1      !! HYDROcodone-acetaminophen (NORCO) 5-325 mg per tablet Take 1 tablet by mouth every 4 (four) hours as needed for Pain.  Qty: 12 tablet, Refills: 0      !! ibuprofen (ADVIL,MOTRIN) 800 MG tablet Take 1 tablet (800 mg total) by mouth every 6 (six) hours as needed for Pain.  Qty: 30 tablet, Refills: 0      miSOPROStoL (CYTOTEC) 200 MCG Tab Place 4 tablets (800 mcg total) vaginally every 3 (three) hours as needed (Repeat dose after 3 hours if no cramping or vaginal bleeding occurs). If you do not have bleeding or cramping after 3 hours, repeat the dose and place 800mcg or 4 tablets intravaginally.  Qty: 8 tablet, Refills: 0    Associated Diagnoses: Missed       ondansetron (ZOFRAN-ODT) 4 MG TbDL Take 1 tablet (4 mg total) by mouth every 6 (six) hours as needed (Nausea and vomiting).  Qty: 30 tablet, Refills: 2      BALCOLTRA 0.1 mg-0.02 mg (21)/36.5 mg(7) Tab Take 1 tablet by mouth once daily.      benzoyl peroxide 5 % external liquid WASH FACE EVERY DAY AS DIRECTED      omeprazole (PRILOSEC) 40 MG capsule Take 1 capsule (40 mg total) by mouth once daily.  Qty: 30 capsule, Refills: 11    Associated Diagnoses: Abdominal bloating; Nausea and vomiting, unspecified vomiting type      oxyCODONE (ROXICODONE) 5 MG immediate release tablet Take 1 tablet (5 mg total) by mouth every 4 (four) hours as needed for Pain.  Qty: 5 tablet, Refills: 0     Comments: Quantity prescribed more than 7 day supply? No       !! - Potential duplicate medications found. Please discuss with provider.          Discharge Procedure Orders   Diet Adult Regular     Lifting restrictions   Order Comments: No lifting > 10 lbs until postoperative appointment     No driving until:   Order Comments: No longer taking narcotics. Able to safely hit brakes without pain.     Pelvic Rest   Order Comments: Nothing inside the vagina for at least 2 weeks after surgery     No dressing needed     Notify your health care provider if you experience any of the following:  temperature >100.4     Notify your health care provider if you experience any of the following:  persistent nausea and vomiting or diarrhea     Notify your health care provider if you experience any of the following:  severe uncontrolled pain     Notify your health care provider if you experience any of the following:  redness, tenderness, or signs of infection (pain, swelling, redness, odor or green/yellow discharge around incision site)     Notify your health care provider if you experience any of the following:  difficulty breathing or increased cough     Notify your health care provider if you experience any of the following:  severe persistent headache     Notify your health care provider if you experience any of the following:  worsening rash     Notify your health care provider if you experience any of the following:  persistent dizziness, light-headedness, or visual disturbances     Notify your health care provider if you experience any of the following:  increased confusion or weakness     Notify your health care provider if you experience any of the following:   Order Comments: Vaginal bleeding saturating more than one pad per hour for >2 hours     Activity as tolerated        Follow-up Information       [facility]. Schedule an appointment as soon as possible for a visit in 4 week(s).    Why: Postop  Appointment                           Katherine Boecking MD   Ob/Gyn PGY-3

## 2023-06-24 NOTE — CARE UPDATE
Repeat CBC wnl.    Recent Labs   Lab 06/23/23  1902   WBC 11.15   RBC 3.36*   HGB 9.5*   HCT 29.4*      MCV 88   MCH 28.3   MCHC 32.3       Due to patient's weakness, and patient's family request, will admit for observation overnight.    Katherine Boecking MD   Ob/Gyn PGY-3

## 2023-06-24 NOTE — OR NURSING
Nursing Note by Usha Riddle CMA at 11/09/18 02:28 PM     Author:  Usha Riddle CMA Service:  (none) Author Type:  Certified Medical Assistant     Filed:  11/09/18 02:28 PM Encounter Date:  11/9/2018 Status:  Signed     :  Usha Riddle CMA (Certified Medical Assistant)            [place] [facility] called 2:28 pm.[SR1.1M]      Revision History        User Key Date/Time User Provider Type Action    > SR1.1 11/09/18 02:28 PM Usha Riddle CMA Certified Medical Assistant Sign    M - Manual             Patient is much more awake and responsive.  She is still very weak and pale, however.  Due to this she will be admitted overnight.

## 2023-06-24 NOTE — PROGRESS NOTES
"OB/Gyn resident and staff on call to bedside secondary to rapid response called.     Upon entry to room, rapid response team at bedside. Patient resting in bed with blankets. Patient visibly pale.     Vital signs stable.   HR 86  O2 sat 99 on RA  /83    Patient awake, alert. Answering questions appropriately. Denies pain or bleeding. Has not had PO intake but reports she is hungry.    Abdominal exam:   Abdomen appropriately tender given post operative state, no rebound or guarding.  : No active bleeding, minimal blood on pad    Discussed with family in meeting room with Ob/Gyn on call staff.    Patient's family members asked rapid response team if they "reversed her medications" with additional drugs. Reassured family that no additional medications were given and patient's vital signs were physiologically within normal limits.    Patient's family member also voiced concerned about "too much anesthesia" and wanting to know exact dosage of anesthesia she received.    Another patient's family member disclosed they are "well experienced with this kind of stuff" and have "sued a hospital before." Additionally mentioned he can "pay big bucks for big time ."     Reassured family that patient is in stable condition. Discussed plan of care with family members in detail. Family members allowed back into room and able to interact with patient at bedside.     Plan:  STAT CBC to assess blood count. Will continue to monitor closely.     Upper level spectra link phone number given to ALEKSANDR Lock.    Katherine Boecking MD   Ob/Gyn PGY-3       "

## 2023-06-24 NOTE — NURSING
Discharge instructions provided.. patient verbalized understanding...  patient  will be leaving shortly with family at bedside.

## 2023-06-24 NOTE — NURSING
Pt came to unit without IV access. IV attempted and was unsuccessful. PT is now refusing all IV reinsertion attempts. MD notified. OK to leave out and have pt PO hydrate. Will continue to monitor.

## 2023-06-24 NOTE — OR NURSING
Patient came to me at 1800 very sleepy and pale.  She was responsive and talking.  VS were stable.  See flow sheet.  When family came to the bedside, patient refused to open her eyes and would not speak.  They were very concerned about her appearance and asked for a doctor to come see her.      After repeated attempts to get patient to speak, a rapid response was called.  The response team quickly responded and also Dr Vargas and Boecking.  See progress notes from the doctor.  VS remain stable.

## 2023-06-26 VITALS
OXYGEN SATURATION: 97 % | BODY MASS INDEX: 23.6 KG/M2 | RESPIRATION RATE: 18 BRPM | TEMPERATURE: 98 F | HEIGHT: 61 IN | HEART RATE: 94 BPM | WEIGHT: 125 LBS | SYSTOLIC BLOOD PRESSURE: 103 MMHG | DIASTOLIC BLOOD PRESSURE: 51 MMHG

## 2023-06-27 ENCOUNTER — TELEPHONE (OUTPATIENT)
Dept: OBSTETRICS AND GYNECOLOGY | Facility: CLINIC | Age: 23
End: 2023-06-27
Payer: MEDICAID

## 2023-06-27 NOTE — TELEPHONE ENCOUNTER
----- Message from Ben Arora MD sent at 6/23/2023  4:49 PM CDT -----  Fredo Irwin,    Can you schedule this patient a post op appointment in family planning clinic? She had a suction D&C for retained products of conception.    Thanks,     Ben Arora MD  OBGYN PGY-1

## 2023-06-27 NOTE — TELEPHONE ENCOUNTER
----- Message from Tara Reyna sent at 6/27/2023  9:52 AM CDT -----  Regarding: FW: appt    ----- Message -----  From: Kimmy Kwon  Sent: 6/27/2023   9:08 AM CDT  To: [facility] [address] Clinical Support Staff  Subject: appt                                               Type:  Patient Returning Call    Who Called:SUMANTH TOMLINSON [[id number]]    Who Left Message for Patient: Alida    Does the patient know what this is regarding? Yes post op appt    Best Call Back Number:[phone]    Additional Information:

## 2023-06-30 LAB
FINAL PATHOLOGIC DIAGNOSIS: NORMAL
GROSS: NORMAL
Lab: NORMAL

## 2023-07-13 ENCOUNTER — OFFICE VISIT (OUTPATIENT)
Dept: OBSTETRICS AND GYNECOLOGY | Facility: CLINIC | Age: 23
End: 2023-07-13
Payer: MEDICAID

## 2023-07-13 VITALS
HEIGHT: 61 IN | DIASTOLIC BLOOD PRESSURE: 70 MMHG | BODY MASS INDEX: 23.73 KG/M2 | WEIGHT: 125.69 LBS | SYSTOLIC BLOOD PRESSURE: 112 MMHG

## 2023-07-13 DIAGNOSIS — Z30.09 ENCOUNTER FOR COUNSELING REGARDING CONTRACEPTION: ICD-10-CM

## 2023-07-13 DIAGNOSIS — Z98.890 S/P D&C (STATUS POST DILATION AND CURETTAGE): Primary | ICD-10-CM

## 2023-07-13 DIAGNOSIS — Z30.09 EMERGENCY CONTRACEPTIVE COUNSELING: ICD-10-CM

## 2023-07-13 PROCEDURE — 3008F PR BODY MASS INDEX (BMI) DOCUMENTED: ICD-10-PCS | Mod: CPTII,,,

## 2023-07-13 PROCEDURE — 99024 PR POST-OP FOLLOW-UP VISIT: ICD-10-PCS | Mod: S$PBB,,,

## 2023-07-13 PROCEDURE — 99999 PR PBB SHADOW E&M-EST. PATIENT-LVL II: CPT | Mod: PBBFAC,,,

## 2023-07-13 PROCEDURE — 99999 PR PBB SHADOW E&M-EST. PATIENT-LVL II: ICD-10-PCS | Mod: PBBFAC,,,

## 2023-07-13 PROCEDURE — 3078F DIAST BP <80 MM HG: CPT | Mod: CPTII,,,

## 2023-07-13 PROCEDURE — 99024 POSTOP FOLLOW-UP VISIT: CPT | Mod: S$PBB,,,

## 2023-07-13 PROCEDURE — 3074F SYST BP LT 130 MM HG: CPT | Mod: CPTII,,,

## 2023-07-13 PROCEDURE — 3074F PR MOST RECENT SYSTOLIC BLOOD PRESSURE < 130 MM HG: ICD-10-PCS | Mod: CPTII,,,

## 2023-07-13 PROCEDURE — 99212 OFFICE O/P EST SF 10 MIN: CPT | Mod: PBBFAC

## 2023-07-13 PROCEDURE — 3008F BODY MASS INDEX DOCD: CPT | Mod: CPTII,,,

## 2023-07-13 PROCEDURE — 3078F PR MOST RECENT DIASTOLIC BLOOD PRESSURE < 80 MM HG: ICD-10-PCS | Mod: CPTII,,,

## 2023-07-13 NOTE — PROGRESS NOTES
"Past medical, surgical, social, family, and obstetric histories; medications; prior records and results; and available outside records were reviewed and updated in the EMR.  Pertinent findings were noted below.    Reason for Visit   Post Op    HPI   22 y.o. female .    Patient's last menstrual period was 2023.    S/p suction D&C on  for retained POC after initial medically managed SAB  Today, patient reports having no residual vaginal bleeding, nor abdominal pain. She is overall coping well. She is feeling some fatigue from chronicity of VB with initially medical managed SAB c/b retained POC. She is taking iron supplements every other day. She denies any fevers, chills, N/V. She denies any urinary urgency or frequency. She denies any diarrhea/constipation. Patient denies any CP, SOB, or dizziness.  Currently sexually active with one male partner, no contraception at this time.  Pathology: UTERINE CONTENTS, SUCTION DILATION AND CURETTAGE:   - Rare immature chorionic villi present, consistent with retained products of conception   - Abundant hemorrhage and inflamed/degenerative decidua   - Features compatible with exaggerated placental site reaction   - Fragments of benign endometrium with gestational changes      Exam   /70   Ht 5' 1" (1.549 m)   Wt 57 kg (125 lb 10.6 oz)   LMP 2023 Comment: missed AB  BMI 23.74 kg/m²     Physical Exam  Constitutional:       General: She is not in acute distress.     Appearance: Normal appearance.   HENT:      Head: Normocephalic and atraumatic.   Eyes:      Extraocular Movements: Extraocular movements intact.   Cardiovascular:      Rate and Rhythm: Normal rate.   Pulmonary:      Effort: Pulmonary effort is normal. No respiratory distress.   Abdominal:      General: Abdomen is flat. There is no distension.      Palpations: Abdomen is soft. There is no mass.      Tenderness: There is no abdominal tenderness. There is no guarding or rebound. "   Musculoskeletal:         General: Normal range of motion.      Cervical back: Normal range of motion.   Neurological:      General: No focal deficit present.      Mental Status: She is alert and oriented to person, place, and time. Mental status is at baseline.   Skin:     General: Skin is warm and dry.   Psychiatric:         Mood and Affect: Mood normal.         Behavior: Behavior normal.         Thought Content: Thought content normal.         Judgment: Judgment normal.   Vitals reviewed.     Assessment and Plan   S/P D&C (status post dilation and curettage)    Encounter for counseling regarding contraception    Emergency contraceptive counseling  -     ulipristaL (ALIYAH) 30 mg tablet; Take 1 tablet (30 mg total) by mouth as needed.  Dispense: 1 tablet; Refill: 2      - Meeting post operative milestones  - Discussed pathology results with patient  - Questions answered    - Counseled on contraception options including combined oral contraceptive pills, progestin-only pills, NuvaRing, patches, DepoProvera, Nexplanon, as well as hormonal and non-hormonal IUDs. Counseled on R/B/A of each. All questions answered.   - Patient states that she does not want to use any forms of contraception at this time and feels she needs to mentally heal further. She seemed open to further discussion on the topic during a future appointment.   - Rx for Aliyah to pharmacy, counseled on emergency contraceptions.  - Discussed need for additional barrier protection, such as a condoms, to reduce the risk of STIs.      Follow up: patient to reach out to clinic when ready for contraception f/u apt. Will need annual exam w/pap after turning 23.      HPI collected and written with Wanda Rico MS3.     Sofia Sosa MD   OB/GYN PGY-2

## 2023-07-13 NOTE — PROGRESS NOTES
I have reviewed the notes, assessments, and/or procedures performed by Dr Sosa and myself, I concur with her/his documentation of Inessa Salgado.

## 2023-09-29 ENCOUNTER — OFFICE VISIT (OUTPATIENT)
Dept: OBSTETRICS AND GYNECOLOGY | Facility: CLINIC | Age: 23
End: 2023-09-29
Payer: MEDICAID

## 2023-09-29 VITALS
SYSTOLIC BLOOD PRESSURE: 108 MMHG | HEIGHT: 61 IN | BODY MASS INDEX: 23.91 KG/M2 | DIASTOLIC BLOOD PRESSURE: 60 MMHG | WEIGHT: 126.63 LBS

## 2023-09-29 DIAGNOSIS — N92.6 IRREGULAR PERIODS: Primary | ICD-10-CM

## 2023-09-29 DIAGNOSIS — N89.8 VAGINAL DISCHARGE: ICD-10-CM

## 2023-09-29 LAB
B-HCG UR QL: NEGATIVE
CTP QC/QA: YES

## 2023-09-29 PROCEDURE — 1159F MED LIST DOCD IN RCRD: CPT | Mod: CPTII,,,

## 2023-09-29 PROCEDURE — 87591 N.GONORRHOEAE DNA AMP PROB: CPT

## 2023-09-29 PROCEDURE — 99999 PR PBB SHADOW E&M-EST. PATIENT-LVL III: ICD-10-PCS | Mod: PBBFAC,,,

## 2023-09-29 PROCEDURE — 3078F PR MOST RECENT DIASTOLIC BLOOD PRESSURE < 80 MM HG: ICD-10-PCS | Mod: CPTII,,,

## 2023-09-29 PROCEDURE — 3074F PR MOST RECENT SYSTOLIC BLOOD PRESSURE < 130 MM HG: ICD-10-PCS | Mod: CPTII,,,

## 2023-09-29 PROCEDURE — 99999PBSHW POCT URINE PREGNANCY: Mod: PBBFAC,,,

## 2023-09-29 PROCEDURE — 81514 NFCT DS BV&VAGINITIS DNA ALG: CPT

## 2023-09-29 PROCEDURE — 99999 PR PBB SHADOW E&M-EST. PATIENT-LVL III: CPT | Mod: PBBFAC,,,

## 2023-09-29 PROCEDURE — 1159F PR MEDICATION LIST DOCUMENTED IN MEDICAL RECORD: ICD-10-PCS | Mod: CPTII,,,

## 2023-09-29 PROCEDURE — 3078F DIAST BP <80 MM HG: CPT | Mod: CPTII,,,

## 2023-09-29 PROCEDURE — 99999PBSHW POCT URINE PREGNANCY: ICD-10-PCS | Mod: PBBFAC,,,

## 2023-09-29 PROCEDURE — 99213 PR OFFICE/OUTPT VISIT, EST, LEVL III, 20-29 MIN: ICD-10-PCS | Mod: S$PBB,,,

## 2023-09-29 PROCEDURE — 3074F SYST BP LT 130 MM HG: CPT | Mod: CPTII,,,

## 2023-09-29 PROCEDURE — 81025 URINE PREGNANCY TEST: CPT | Mod: PBBFAC

## 2023-09-29 PROCEDURE — 3008F PR BODY MASS INDEX (BMI) DOCUMENTED: ICD-10-PCS | Mod: CPTII,,,

## 2023-09-29 PROCEDURE — 99213 OFFICE O/P EST LOW 20 MIN: CPT | Mod: PBBFAC

## 2023-09-29 PROCEDURE — 3008F BODY MASS INDEX DOCD: CPT | Mod: CPTII,,,

## 2023-09-29 PROCEDURE — 99213 OFFICE O/P EST LOW 20 MIN: CPT | Mod: S$PBB,,,

## 2023-09-29 NOTE — PROGRESS NOTES
Subjective:      Patient ID: Inessa Salgado is a 22 y.o. female.    Chief Complaint:  Possible Pregnancy      History of Present Illness  HPI  Ms Inessa Slagado is a 21 yo  that presents with complaints of pregnancy symptoms.  She reports a pregnancy resulting in miscarriage a few months ago, and at that time had nausea, vomiting, and diarrhea.  She reports having those same symptoms for the last few days.  She also reports having an irregular cycle.  She does not use the period tracker, but does record her dates. Reports last few periods were , Aug 18, and .   She is currently not on birth control. She had unprotected sex on 9/10/2023. She took a Plan B pill the following day. Discussed side effects of Plan B.   UPT in clinic is negative.    She also reports a mucousy discharge a few days ago.     Requesting STD screening.    GYN & OB History  Patient's last menstrual period was 2023 (exact date).   Date of Last Pap: No result found    OB History    Para Term  AB Living   1       1     SAB IAB Ectopic Multiple Live Births   1              # Outcome Date GA Lbr [place]/2nd Weight Sex Delivery Anes PTL Lv   1 2023    U SAB   FD       Review of Systems  Review of Systems   Constitutional:  Negative for activity change and appetite change.   Respiratory:  Negative for shortness of breath.    Cardiovascular:  Negative for chest pain.   Gastrointestinal:  Positive for diarrhea and nausea. Negative for abdominal pain.   Genitourinary:  Positive for menstrual problem and vaginal discharge. Negative for bladder incontinence, decreased libido, dysmenorrhea, dyspareunia, dysuria, flank pain, frequency, genital sores, hematuria, hot flashes, menorrhagia, pelvic pain, urgency, vaginal bleeding, vaginal pain, urinary incontinence, postcoital bleeding, postmenopausal bleeding, vaginal dryness and vaginal odor.   Integumentary:  Negative for breast tenderness.   Neurological:  Negative for  headaches.   Breast: Negative for breast self exam and tenderness         Objective:     Physical Exam:   Constitutional: She is oriented to person, place, and time. She appears well-developed and well-nourished.    HENT:   Head: Normocephalic.      Cardiovascular:       Exam reveals no clubbing.        Pulmonary/Chest: Effort normal and breath sounds normal. Right breast exhibits no nipple discharge, no skin change, no tenderness, no bleeding and no swelling. Left breast exhibits no nipple discharge, no skin change, no tenderness, no bleeding and no swelling. Breasts are symmetrical.        Abdominal: Soft. There is no abdominal tenderness. Hernia confirmed negative in the right inguinal area and confirmed negative in the left inguinal area.     Genitourinary:    Inguinal canal, uterus, right adnexa, left adnexa and rectum normal.   Rectum:      No tenderness.      Pelvic exam was performed with patient supine.   The external female genitalia was normal.   No external genitalia lesions identified,Genitalia hair distrobution normal .   Labial bartholins normal.Cervix is normal. There is vaginal discharge (copious white) in the vagina. No tenderness in the vagina.    No signs of injury in the vagina.   Vagina was moist.Cervix exhibits no discharge and no tenderness. Uterus is not tender. Normal urethral meatus.Urethra findings: no tendernessBladder findings: no bladder tenderness          Musculoskeletal: Normal range of motion and moves all extremeties.      Lymphadenopathy: No inguinal adenopathy noted on the right or left side.    Neurological: She is alert and oriented to person, place, and time.    Skin: Skin is warm. Nails show no clubbing.    Psychiatric: She has a normal mood and affect. Her behavior is normal. Judgment and thought content normal.         Assessment:     1. Irregular periods    2. Vaginal discharge              Plan:   1. Irregular periods  - POCT Urine Pregnancy    2. Vaginal discharge  - C.  trachomatis/N. gonorrhoeae by AMP DNA  - Vaginosis Screen by DNA Probe     Pt instructed on staying hydrated and following up with PCP if N/V/D continues longer than a week.  Take at home pregnancy test if no cycle by 10/15.  Follow up PRN

## 2023-10-01 LAB
C TRACH DNA SPEC QL NAA+PROBE: NOT DETECTED
N GONORRHOEA DNA SPEC QL NAA+PROBE: NOT DETECTED

## 2023-10-03 DIAGNOSIS — B37.9 YEAST DETECTED: Primary | ICD-10-CM

## 2023-10-03 LAB
BACTERIAL VAGINOSIS DNA: NEGATIVE
CANDIDA GLABRATA DNA: POSITIVE
CANDIDA KRUSEI DNA: NEGATIVE
CANDIDA RRNA VAG QL PROBE: POSITIVE
T VAGINALIS RRNA GENITAL QL PROBE: NEGATIVE

## 2023-10-03 RX ORDER — FLUCONAZOLE 150 MG/1
150 TABLET ORAL DAILY
Qty: 1 TABLET | Refills: 0 | Status: SHIPPED | OUTPATIENT
Start: 2023-10-03 | End: 2023-10-04

## 2023-11-09 ENCOUNTER — OFFICE VISIT (OUTPATIENT)
Dept: OBSTETRICS AND GYNECOLOGY | Facility: CLINIC | Age: 23
End: 2023-11-09
Payer: MEDICAID

## 2023-11-09 VITALS
SYSTOLIC BLOOD PRESSURE: 118 MMHG | BODY MASS INDEX: 24.97 KG/M2 | DIASTOLIC BLOOD PRESSURE: 64 MMHG | HEIGHT: 61 IN | WEIGHT: 132.25 LBS

## 2023-11-09 DIAGNOSIS — Z30.09 ENCOUNTER FOR COUNSELING REGARDING CONTRACEPTION: ICD-10-CM

## 2023-11-09 DIAGNOSIS — N92.6 IRREGULAR PERIODS: ICD-10-CM

## 2023-11-09 DIAGNOSIS — Z01.419 WELL WOMAN EXAM: Primary | ICD-10-CM

## 2023-11-09 DIAGNOSIS — Z12.4 CERVICAL CANCER SCREENING: ICD-10-CM

## 2023-11-09 LAB
B-HCG UR QL: NEGATIVE
CTP QC/QA: YES

## 2023-11-09 PROCEDURE — 1159F MED LIST DOCD IN RCRD: CPT | Mod: CPTII,,,

## 2023-11-09 PROCEDURE — 99395 PR PREVENTIVE VISIT,EST,18-39: ICD-10-PCS | Mod: S$PBB,,,

## 2023-11-09 PROCEDURE — 3074F SYST BP LT 130 MM HG: CPT | Mod: CPTII,,,

## 2023-11-09 PROCEDURE — 3078F PR MOST RECENT DIASTOLIC BLOOD PRESSURE < 80 MM HG: ICD-10-PCS | Mod: CPTII,,,

## 2023-11-09 PROCEDURE — 3074F PR MOST RECENT SYSTOLIC BLOOD PRESSURE < 130 MM HG: ICD-10-PCS | Mod: CPTII,,,

## 2023-11-09 PROCEDURE — 99395 PREV VISIT EST AGE 18-39: CPT | Mod: S$PBB,,,

## 2023-11-09 PROCEDURE — 1159F PR MEDICATION LIST DOCUMENTED IN MEDICAL RECORD: ICD-10-PCS | Mod: CPTII,,,

## 2023-11-09 PROCEDURE — 3008F BODY MASS INDEX DOCD: CPT | Mod: CPTII,,,

## 2023-11-09 PROCEDURE — 3078F DIAST BP <80 MM HG: CPT | Mod: CPTII,,,

## 2023-11-09 PROCEDURE — 99213 OFFICE O/P EST LOW 20 MIN: CPT | Mod: PBBFAC

## 2023-11-09 PROCEDURE — 3008F PR BODY MASS INDEX (BMI) DOCUMENTED: ICD-10-PCS | Mod: CPTII,,,

## 2023-11-09 PROCEDURE — 99999 PR PBB SHADOW E&M-EST. PATIENT-LVL III: ICD-10-PCS | Mod: PBBFAC,,,

## 2023-11-09 PROCEDURE — 99999PBSHW POCT URINE PREGNANCY: Mod: PBBFAC,,,

## 2023-11-09 PROCEDURE — 99999 PR PBB SHADOW E&M-EST. PATIENT-LVL III: CPT | Mod: PBBFAC,,,

## 2023-11-09 PROCEDURE — 88175 CYTOPATH C/V AUTO FLUID REDO: CPT

## 2023-11-09 PROCEDURE — 99999PBSHW POCT URINE PREGNANCY: ICD-10-PCS | Mod: PBBFAC,,,

## 2023-11-09 PROCEDURE — 81025 URINE PREGNANCY TEST: CPT | Mod: PBBFAC

## 2023-11-09 RX ORDER — LEVONORGESTREL AND ETHINYL ESTRADIOL 0.1-0.02MG
1 KIT ORAL DAILY
Qty: 30 TABLET | Refills: 11 | Status: SHIPPED | OUTPATIENT
Start: 2023-11-09 | End: 2024-11-08
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
## 2023-11-20 LAB
FINAL PATHOLOGIC DIAGNOSIS: NORMAL
Lab: NORMAL

## 2024-01-21 ENCOUNTER — HOSPITAL ENCOUNTER (EMERGENCY)
Facility: HOSPITAL | Age: 24
Discharge: HOME | End: 2024-01-21
Attending: STUDENT IN AN ORGANIZED HEALTH CARE EDUCATION/TRAINING PROGRAM
Payer: MEDICAID

## 2024-01-21 VITALS
DIASTOLIC BLOOD PRESSURE: 57 MMHG | OXYGEN SATURATION: 98 % | HEIGHT: 61 IN | TEMPERATURE: 99 F | BODY MASS INDEX: 24.55 KG/M2 | WEIGHT: 130 LBS | RESPIRATION RATE: 18 BRPM | SYSTOLIC BLOOD PRESSURE: 102 MMHG | HEART RATE: 90 BPM

## 2024-01-21 DIAGNOSIS — R10.13 EPIGASTRIC ABDOMINAL PAIN: ICD-10-CM

## 2024-01-21 DIAGNOSIS — R11.2 NAUSEA AND VOMITING, UNSPECIFIED VOMITING TYPE: ICD-10-CM

## 2024-01-21 DIAGNOSIS — N39.0 URINARY TRACT INFECTION WITHOUT HEMATURIA, SITE UNSPECIFIED: Primary | ICD-10-CM

## 2024-01-21 LAB
ALBUMIN SERPL BCP-MCNC: 4.3 G/DL (ref 3.5–5.2)
ALP SERPL-CCNC: 106 U/L (ref 55–135)
ALT SERPL W/O P-5'-P-CCNC: 14 U/L (ref 10–44)
ANION GAP SERPL CALC-SCNC: 10 MMOL/L (ref 8–16)
AST SERPL-CCNC: 15 U/L (ref 10–40)
BACTERIA #/AREA URNS HPF: ABNORMAL /HPF
BASOPHILS # BLD AUTO: 0.01 K/UL (ref 0–0.2)
BASOPHILS NFR BLD: 0.1 % (ref 0–1.9)
BILIRUB SERPL-MCNC: 0.3 MG/DL (ref 0.1–1)
BILIRUB UR QL STRIP: NEGATIVE
BUN SERPL-MCNC: 17 MG/DL (ref 6–20)
CALCIUM SERPL-MCNC: 8.8 MG/DL (ref 8.7–10.5)
CHLORIDE SERPL-SCNC: 101 MMOL/L (ref 95–110)
CLARITY UR: ABNORMAL
CO2 SERPL-SCNC: 25 MMOL/L (ref 23–29)
COLOR UR: YELLOW
CREAT SERPL-MCNC: 0.7 MG/DL (ref 0.5–1.4)
DIFFERENTIAL METHOD BLD: ABNORMAL
EOSINOPHIL # BLD AUTO: 0.1 K/UL (ref 0–0.5)
EOSINOPHIL NFR BLD: 0.8 % (ref 0–8)
ERYTHROCYTE [DISTWIDTH] IN BLOOD BY AUTOMATED COUNT: 12.4 % (ref 11.5–14.5)
EST. GFR  (NO RACE VARIABLE): >60 ML/MIN/1.73 M^2
GLUCOSE SERPL-MCNC: 112 MG/DL (ref 70–110)
GLUCOSE UR QL STRIP: NEGATIVE
HCT VFR BLD AUTO: 44 % (ref 37–48.5)
HGB BLD-MCNC: 13.8 G/DL (ref 12–16)
HGB UR QL STRIP: ABNORMAL
HYALINE CASTS #/AREA URNS LPF: 11 /LPF
IMM GRANULOCYTES # BLD AUTO: 0.02 K/UL (ref 0–0.04)
IMM GRANULOCYTES NFR BLD AUTO: 0.3 % (ref 0–0.5)
KETONES UR QL STRIP: ABNORMAL
LEUKOCYTE ESTERASE UR QL STRIP: ABNORMAL
LIPASE SERPL-CCNC: 17 U/L (ref 4–60)
LYMPHOCYTES # BLD AUTO: 1.4 K/UL (ref 1–4.8)
LYMPHOCYTES NFR BLD: 20.1 % (ref 18–48)
MAGNESIUM SERPL-MCNC: 1.8 MG/DL (ref 1.6–2.6)
MCH RBC QN AUTO: 26.4 PG (ref 27–31)
MCHC RBC AUTO-ENTMCNC: 31.4 G/DL (ref 32–36)
MCV RBC AUTO: 84 FL (ref 82–98)
MICROSCOPIC COMMENT: ABNORMAL
MONOCYTES # BLD AUTO: 0.7 K/UL (ref 0.3–1)
MONOCYTES NFR BLD: 9.3 % (ref 4–15)
NEUTROPHILS # BLD AUTO: 4.9 K/UL (ref 1.8–7.7)
NEUTROPHILS NFR BLD: 69.4 % (ref 38–73)
NITRITE UR QL STRIP: POSITIVE
NRBC BLD-RTO: 0 /100 WBC
PH UR STRIP: 7 [PH] (ref 5–8)
PLATELET # BLD AUTO: 312 K/UL (ref 150–450)
PMV BLD AUTO: 9.2 FL (ref 9.2–12.9)
POTASSIUM SERPL-SCNC: 3.4 MMOL/L (ref 3.5–5.1)
PROT SERPL-MCNC: 7.4 G/DL (ref 6–8.4)
PROT UR QL STRIP: ABNORMAL
RBC # BLD AUTO: 5.23 M/UL (ref 4–5.4)
RBC #/AREA URNS HPF: 5 /HPF (ref 0–4)
SODIUM SERPL-SCNC: 136 MMOL/L (ref 136–145)
SP GR UR STRIP: >1.03 (ref 1–1.03)
SQUAMOUS #/AREA URNS HPF: 8 /HPF
URN SPEC COLLECT METH UR: ABNORMAL
UROBILINOGEN UR STRIP-ACNC: NEGATIVE EU/DL
WBC # BLD AUTO: 7.12 K/UL (ref 3.9–12.7)
WBC #/AREA URNS HPF: >100 /HPF (ref 0–5)

## 2024-01-21 PROCEDURE — 63600175 PHARM REV CODE 636 W HCPCS: Performed by: STUDENT IN AN ORGANIZED HEALTH CARE EDUCATION/TRAINING PROGRAM

## 2024-01-21 PROCEDURE — 83735 ASSAY OF MAGNESIUM: CPT | Performed by: STUDENT IN AN ORGANIZED HEALTH CARE EDUCATION/TRAINING PROGRAM

## 2024-01-21 PROCEDURE — 87077 CULTURE AEROBIC IDENTIFY: CPT | Performed by: STUDENT IN AN ORGANIZED HEALTH CARE EDUCATION/TRAINING PROGRAM

## 2024-01-21 PROCEDURE — 96375 TX/PRO/DX INJ NEW DRUG ADDON: CPT

## 2024-01-21 PROCEDURE — 96374 THER/PROPH/DIAG INJ IV PUSH: CPT

## 2024-01-21 PROCEDURE — 87086 URINE CULTURE/COLONY COUNT: CPT | Performed by: STUDENT IN AN ORGANIZED HEALTH CARE EDUCATION/TRAINING PROGRAM

## 2024-01-21 PROCEDURE — 87186 SC STD MICRODIL/AGAR DIL: CPT | Performed by: STUDENT IN AN ORGANIZED HEALTH CARE EDUCATION/TRAINING PROGRAM

## 2024-01-21 PROCEDURE — 96361 HYDRATE IV INFUSION ADD-ON: CPT

## 2024-01-21 PROCEDURE — 80053 COMPREHEN METABOLIC PANEL: CPT | Performed by: STUDENT IN AN ORGANIZED HEALTH CARE EDUCATION/TRAINING PROGRAM

## 2024-01-21 PROCEDURE — 99284 EMERGENCY DEPT VISIT MOD MDM: CPT | Mod: 25

## 2024-01-21 PROCEDURE — 25000003 PHARM REV CODE 250: Performed by: STUDENT IN AN ORGANIZED HEALTH CARE EDUCATION/TRAINING PROGRAM

## 2024-01-21 PROCEDURE — 85025 COMPLETE CBC W/AUTO DIFF WBC: CPT | Performed by: STUDENT IN AN ORGANIZED HEALTH CARE EDUCATION/TRAINING PROGRAM

## 2024-01-21 PROCEDURE — 83690 ASSAY OF LIPASE: CPT | Performed by: STUDENT IN AN ORGANIZED HEALTH CARE EDUCATION/TRAINING PROGRAM

## 2024-01-21 PROCEDURE — 81001 URINALYSIS AUTO W/SCOPE: CPT | Performed by: STUDENT IN AN ORGANIZED HEALTH CARE EDUCATION/TRAINING PROGRAM

## 2024-01-21 RX ORDER — IBUPROFEN 600 MG/1
600 TABLET ORAL EVERY 6 HOURS PRN
Qty: 20 TABLET | Refills: 0 | Status: SHIPPED | OUTPATIENT
Start: 2024-01-21

## 2024-01-21 RX ORDER — FAMOTIDINE 10 MG/ML
20 INJECTION INTRAVENOUS
Status: COMPLETED | OUTPATIENT
Start: 2024-01-21 | End: 2024-01-21

## 2024-01-21 RX ORDER — FAMOTIDINE 20 MG/1
20 TABLET, FILM COATED ORAL 2 TIMES DAILY
Qty: 20 TABLET | Refills: 0 | Status: SHIPPED | OUTPATIENT
Start: 2024-01-21 | End: 2025-01-20

## 2024-01-21 RX ORDER — NITROFURANTOIN 25; 75 MG/1; MG/1
100 CAPSULE ORAL 2 TIMES DAILY
Qty: 10 CAPSULE | Refills: 0 | Status: SHIPPED | OUTPATIENT
Start: 2024-01-21 | End: 2024-01-26

## 2024-01-21 RX ORDER — ONDANSETRON HYDROCHLORIDE 2 MG/ML
4 INJECTION, SOLUTION INTRAVENOUS
Status: COMPLETED | OUTPATIENT
Start: 2024-01-21 | End: 2024-01-21

## 2024-01-21 RX ORDER — ONDANSETRON 4 MG/1
4 TABLET, FILM COATED ORAL EVERY 6 HOURS
Qty: 12 TABLET | Refills: 0 | Status: SHIPPED | OUTPATIENT
Start: 2024-01-21

## 2024-01-21 RX ORDER — NITROFURANTOIN 25; 75 MG/1; MG/1
100 CAPSULE ORAL
Status: COMPLETED | OUTPATIENT
Start: 2024-01-21 | End: 2024-01-21

## 2024-01-21 RX ADMIN — NITROFURANTOIN MONOHYDRATE/MACROCRYSTALS 100 MG: 25; 75 CAPSULE ORAL at 04:01

## 2024-01-21 RX ADMIN — FAMOTIDINE 20 MG: 10 INJECTION INTRAVENOUS at 02:01

## 2024-01-21 RX ADMIN — SODIUM CHLORIDE 1000 ML: 9 INJECTION, SOLUTION INTRAVENOUS at 02:01

## 2024-01-21 RX ADMIN — ONDANSETRON 4 MG: 2 INJECTION INTRAMUSCULAR; INTRAVENOUS at 02:01

## 2024-01-21 NOTE — ED PROVIDER NOTES
"Encounter Date: 1/21/2024       History     Chief Complaint   Patient presents with    Vomiting    Fever    Abdominal Pain     Pt said she was seen at urgent care and dx with a UTI.   at urgent care told pt "uti really bad".  Pt says shes having severe abd pain and vomiting      23-year-old female with history of ESBL UTI presents now for urinary tract infection symptoms.  She initially reported 3 days of diarrhea, intermittent nausea, which resolved 2 days ago, now she has epigastric abdominal pain and went to urgent care for evaluation yesterday.  While at urgent Care, she was noted to have a UA consistent with UTI and started on Keflex.  Patient took 1 dose and continued to have epigastric abdominal pain nausea and vomiting so came here for further evaluation.      Review of patient's allergies indicates:   Allergen Reactions    Augmentin [amoxicillin-pot clavulanate] Hives    Morphine      Pt not sure of reaction was told by her mother she is allergic.  Pt has taken Oxycodone before, reports nausea, but can tolerate if she eats before.    Penicillins Other (See Comments)    Suprax [cefixime]      Past Medical History:   Diagnosis Date    Seizures     febrile seizure     Past Surgical History:   Procedure Laterality Date    DILATION AND CURETTAGE OF UTERUS USING SUCTION N/A 6/23/2023    Procedure: DILATION AND CURETTAGE, UTERUS, USING SUCTION;  Surgeon: Yolanda Florian MD;  Location: [facility];  Service: OB/GYN;  Laterality: N/A;    ESOPHAGOGASTRODUODENOSCOPY  11/03/2022    ESOPHAGOGASTRODUODENOSCOPY N/A 11/3/2022    Procedure: EGD (ESOPHAGOGASTRODUODENOSCOPY);  Surgeon: Ronnie Garcia MD;  Location: [facility];  Service: Endoscopy;  Laterality: N/A;     Family History   Problem Relation Age of Onset    Heart disease Mother     Hyperlipidemia Mother     Hypertension Father     Hyperlipidemia Father      Social History     Tobacco Use    Smoking status: Never    Smokeless tobacco: Never   Substance Use " Topics    Alcohol use: Yes     Comment: Occasionally.    Drug use: Never     Review of Systems   Constitutional:  Negative for activity change, appetite change, chills, fever and unexpected weight change.   HENT:  Negative for dental problem and drooling.    Eyes:  Negative for discharge and itching.   Respiratory:  Negative for cough, chest tightness, shortness of breath, wheezing and stridor.    Cardiovascular:  Negative for chest pain, palpitations and leg swelling.   Gastrointestinal:  Positive for abdominal pain, nausea and vomiting. Negative for abdominal distention and diarrhea.   Genitourinary:  Negative for difficulty urinating, dysuria, frequency and urgency.   Musculoskeletal:  Negative for back pain, gait problem and joint swelling.   Neurological:  Negative for dizziness, syncope, numbness and headaches.   Psychiatric/Behavioral:  Negative for agitation, behavioral problems and confusion.        Physical Exam     Initial Vitals [01/21/24 0150]   BP Pulse Resp Temp SpO2   98/67 107 18 98.5 °F (36.9 °C) 96 %      MAP       --         Physical Exam    Nursing note and vitals reviewed.  Constitutional: She appears well-developed and well-nourished. She is not diaphoretic.   HENT:   Head: Normocephalic and atraumatic.   Mouth/Throat: Oropharynx is clear and moist.   Eyes: EOM are normal. Pupils are equal, round, and reactive to light. Right eye exhibits no discharge. Left eye exhibits no discharge.   Neck: No tracheal deviation present.   Normal range of motion.  Cardiovascular:  Normal rate, regular rhythm and intact distal pulses.           Pulmonary/Chest: No respiratory distress. She has no wheezes. She exhibits no tenderness.   Abdominal: Abdomen is soft. She exhibits no distension. There is no abdominal tenderness.   Musculoskeletal:         General: No tenderness or edema. Normal range of motion.      Cervical back: Normal range of motion.     Neurological: She is alert and oriented to person, place,  and time. She has normal strength. No cranial nerve deficit or sensory deficit. GCS eye subscore is 4. GCS verbal subscore is 5. GCS motor subscore is 6.   Skin: Skin is warm and dry. No rash noted.   Psychiatric: She has a normal mood and affect. Her behavior is normal. Thought content normal.         ED Course   Procedures  Labs Reviewed   CBC W/ AUTO DIFFERENTIAL - Abnormal; Notable for the following components:       Result Value    MCH 26.4 (*)     MCHC 31.4 (*)     All other components within normal limits   COMPREHENSIVE METABOLIC PANEL - Abnormal; Notable for the following components:    Potassium 3.4 (*)     Glucose 112 (*)     All other components within normal limits   URINALYSIS, REFLEX TO URINE CULTURE - Abnormal; Notable for the following components:    Appearance, UA Hazy (*)     Specific Gravity, UA >1.030 (*)     Protein, UA 1+ (*)     Ketones, UA 2+ (*)     Occult Blood UA Trace (*)     Nitrite, UA Positive (*)     Leukocytes, UA 3+ (*)     All other components within normal limits    Narrative:     Specimen Source->Urine   URINALYSIS MICROSCOPIC - Abnormal; Notable for the following components:    RBC, UA 5 (*)     WBC, UA >100 (*)     Hyaline Casts, UA 11 (*)     All other components within normal limits    Narrative:     Specimen Source->Urine   CULTURE, URINE   LIPASE   MAGNESIUM   POCT URINE PREGNANCY          Imaging Results    None          Medications   sodium chloride 0.9% bolus 1,000 mL 1,000 mL (0 mLs Intravenous Stopped 1/21/24 0325)   ondansetron injection 4 mg (4 mg Intravenous Given 1/21/24 0226)   famotidine (PF) injection 20 mg (20 mg Intravenous Given 1/21/24 0226)   nitrofurantoin (macrocrystal-monohydrate) 100 MG capsule 100 mg (100 mg Oral Given 1/21/24 0405)     Medical Decision Making  23-year-old female presents for epigastric abdominal pain nausea vomiting and re-evaluation of UTI.  Vitals notable for tachycardia, mildly soft pressure, otherwise within normal limits.   Patient is afebrile.  She is overall well-appearing.  She does not appear septic.  Patient given IV fluids and Zofran and Pepcid with improvement in symptoms.  Her abdomen is soft and nontender without rebound or guarding, doubt acute intra-abdominal pathology.  CBC and CMP within normal limits.  UA consistent with UTI.  Chart review shows prior UTI with drug resistance to Cipro, cephalosporins, penicillins but sensitivity to Macrobid, pending Plata, gentamicin.  Given how well the patient appears, she has in no acute distress on re-evaluation and tolerating p.o. intake.  Her blood pressure improved as did her heart rate.  I will discharge her with Macrobid, Zofran, Pepcid, outpatient follow-up and return precautions for worsening symptoms.  She expressed understanding and stable for discharge.    Amount and/or Complexity of Data Reviewed  Labs: ordered.    Risk  Prescription drug management.                                      Clinical Impression:  Final diagnoses:  [N39.0] Urinary tract infection without hematuria, site unspecified (Primary)  [R11.2] Nausea and vomiting, unspecified vomiting type  [R10.13] Epigastric abdominal pain          ED Disposition Condition    Discharge Stable          ED Prescriptions       Medication Sig Dispense Start Date End Date Auth. Provider    ondansetron (ZOFRAN) 4 MG tablet Take 1 tablet (4 mg total) by mouth every 6 (six) hours. 12 tablet 1/21/2024 -- Marco Nuñez MD    nitrofurantoin, macrocrystal-monohydrate, (MACROBID) 100 MG capsule Take 1 capsule (100 mg total) by mouth 2 (two) times daily. for 5 days 10 capsule 1/21/2024 1/26/2024 Marco Nuñez MD    ibuprofen (ADVIL,MOTRIN) 600 MG tablet Take 1 tablet (600 mg total) by mouth every 6 (six) hours as needed for Pain. 20 tablet 1/21/2024 -- Marco Nuñez MD    famotidine (PEPCID) 20 MG tablet Take 1 tablet (20 mg total) by mouth 2 (two) times daily. 20 tablet 1/21/2024 1/20/2025 Marco Nuñez MD           Follow-up Information       Follow up With Specialties Details Why Contact Info    Vesta Ye NP Family Medicine Call in 1 day To recheck today's symptoms [address] [facility] [id number] [phone]               Marco Nuñez MD  01/21/24 0521

## 2024-01-21 NOTE — DISCHARGE INSTRUCTIONS

## 2024-01-23 LAB — BACTERIA UR CULT: ABNORMAL

## 2024-07-29 ENCOUNTER — TELEPHONE (OUTPATIENT)
Dept: GASTROENTEROLOGY | Facility: CLINIC | Age: 24
End: 2024-07-29
Payer: MEDICAID

## 2024-08-02 ENCOUNTER — OFFICE VISIT (OUTPATIENT)
Dept: GASTROENTEROLOGY | Facility: CLINIC | Age: 24
End: 2024-08-02
Payer: MEDICAID

## 2024-08-02 ENCOUNTER — PATIENT MESSAGE (OUTPATIENT)
Dept: GASTROENTEROLOGY | Facility: CLINIC | Age: 24
End: 2024-08-02

## 2024-08-02 DIAGNOSIS — K59.00 CONSTIPATION, UNSPECIFIED CONSTIPATION TYPE: ICD-10-CM

## 2024-08-02 DIAGNOSIS — R10.84 GENERALIZED ABDOMINAL PAIN: Primary | ICD-10-CM

## 2024-08-02 DIAGNOSIS — R14.0 ABDOMINAL BLOATING: ICD-10-CM

## 2024-08-02 RX ORDER — SODIUM, POTASSIUM,MAG SULFATES 17.5-3.13G
1 SOLUTION, RECONSTITUTED, ORAL ORAL DAILY
Qty: 1 KIT | Refills: 0 | Status: SHIPPED | OUTPATIENT
Start: 2024-08-02

## 2024-08-02 NOTE — PROGRESS NOTES
GASTROENTEROLOGY CLINIC NOTE    Chief Complaint: The primary encounter diagnosis was Generalized abdominal pain. Diagnoses of Constipation, unspecified constipation type and Abdominal bloating were also pertinent to this visit.  Referring provider/PCP: Vesta Ye NP    HPI:  Inessa Salgado is a 23 y.o. female who is a new patient to me with a PMH that's significant for abdominal pain and anxiety and is accompanied by her mother. She was referred to us by her PCP and is here today to establish care for abdominal pain, reflux, and bloating.  Reports she has had h/o GI problems since she was 15 yo but feels symptoms have worsened over the last two years since starting Depo-Provera.  Symptoms occur daily; never goes one day without abdominal pain. It waxes and wanes throughout the day but never completely resolves. Pain is primarily located in the upper abdomen and occurs prior to eating and after eating.  Pain described as stabbing and cramping in nature. It is worse with eating and having a bowel movement.  Greasy food and spicy foods trigger symptoms. It is accompanied by bloating, gas, reflux, nausea, regurgitation, and belching.  She is unsure of the duration of the pain.  Bowel movements occur every two days and are hard in consistency. Incomplete emptying with bowel movements but no straining. Denies melena or hematochezia.  She sought care with PCP about two months ago for abdominal bloating and epigastric burning.  At that time bentyl and omeprazole were initiated. Patient is not taking either medication and states she wants to find out what is causing her symptoms not hide the problem.  She is extremely fixated on the fact that the Depo is causing her symptoms.     Interval Note 11/18/2022  Inessa Salgado who is known to me presents for follow up regarding abdominal pain, bloating, nausea, and vomiting. She is accompanied by her mother. Following her last appointment, it was recommended  "she start omeprazole to help with bloating/reflux.  She has not taken the omeprazole.  She also underwent EGD with Dr. Garcia which was unrevealing.  She continues to experience mid to lower abdominal pain described as stabbing/cramping in nature.  It is accompanied by nausea, vomiting, belching, decreased appetite, and early satiety.  Bowel movements occur every two days and are generally soft in consistency.  Sometimes will have [place] Type 1 bowel movements. Has not noted correlation with symptoms and harder stools.  Some improvement in abdominal pain noted after bowel movements.  Nausea and vomiting primarily occur with bowel movements.  Describes "flare" abdominal pain, urge to have bowel movement, nausea, then vomiting during bowel movement. Vomitus consists of mucus, liquid, and some old food from previous meals.  Nausea mostly occurs prior to bowel movement but may also occur after eating. No melena or hematochezia, or nocturnal symptoms.  Denies frequent NSAID use, ETOH use, THC, or caffeine use.     Interval Note 1/20/2023  Inessa Salgado who is known to me presents to clinic for follow up for abdominal pain, nausea, and vomiting.  She is accompanied by her mother and boyfriend.  Following her last appointment, it was recommended she start omeprazole daily and she was sent for gastric emptying study. Gastric emptying study was normal. Celiac labs and H.pylori were also obtained and were normal.  Today she reports constipation, straining, and generalized abdominal pain with bowel movements.  Nausea and vomiting occur with eating greasy food and when having a bowel movement.  Bowel movements occur every 2 days and are small and hard in consistency.  She is no longer taking omeprazole b/c she may be pregnant.  Stopped birth control in December and has not had menstrual cycle since November.  Unable to tell me if omeprazole helped symptoms as she did not take in consistently.      Interval Note " "8/2/2024    The patient location is: [place]  The chief complaint leading to consultation is: follow up abdominal pain and constipation    Visit type: audiovisual    Face to Face time with patient: 17:05  30 minutes of total time spent on the encounter, which includes face to face time and non-face to face time preparing to see the patient (eg, review of tests), Obtaining and/or reviewing separately obtained history, Documenting clinical information in the electronic or other health record, Independently interpreting results (not separately reported) and communicating results to the patient/family/caregiver, or Care coordination (not separately reported).     Each patient to whom he or she provides medical services by telemedicine is:  (1) informed of the relationship between the physician and patient and the respective role of any other health care provider with respect to management of the patient; and (2) notified that he or she may decline to receive medical services by telemedicine and may withdraw from such care at any time.    Notes:      Inessa Salgado who is known to me presents to clinic for follow up abdominal pain and constipation. She is also concerned she has diverticulitis b/c she completed  and Me DNA where the results stated she was "likely to have diverticulitis" She believes diverticulitis is causing her symptoms. Constipation continues and is accompanied by bloating and nausea. Bowel movements every 2-3 days. She is also concerned she may have colon cancer because her grandfather had colon cancer and she is requesting a colonoscopy.         Treatments Tried:Pepto Bismol, Bentyl, Omeprazole (did not take), IBgard  NSAIDs: No  Anticoagulation or Antiplatelet: No    Prior Upper Endoscopy: 11/3/2022 with Dr. Garcia  Impression:            - Normal esophagus.                          - Normal stomach. Biopsied.                          - Normal examined duodenum.     Recommendation:        - " Discharge patient to home.                          - Resume previous diet.                          - Continue present medications.                          - Await pathology results.                          - Return to nurse practitioner PRN.  Pathology:  Stomach, biopsies:   - Gastric antral and body mucosa with no histopathologic abnormalities    Comment:  Negative for atrophy or intestinal metaplasia.  Negative for   dysplasia or malignancy.     Prior Colonoscopy: No  Family h/o Colon Cancer: grandfather  Family h/o Crohn's Disease or Ulcerative Colitis: No  Family h/o Celiac Sprue: No  Abdominal Surgeries: None    Review of Systems   Constitutional:  Negative for weight loss.   HENT:  Negative for sore throat.    Eyes:  Negative for blurred vision.   Respiratory:  Negative for cough.    Cardiovascular:  Negative for chest pain.   Gastrointestinal:  Positive for abdominal pain (general), constipation and nausea. Negative for blood in stool, diarrhea, heartburn, melena and vomiting.   Genitourinary:  Negative for dysuria.   Musculoskeletal:  Negative for myalgias.   Skin:  Negative for rash.   Neurological:  Negative for headaches.   Endo/Heme/Allergies:  Negative for environmental allergies.   Psychiatric/Behavioral:  Negative for suicidal ideas. The patient is not nervous/anxious.        Past Medical History: has a past medical history of Seizures.    Past Surgical History: has a past surgical history that includes Esophagogastroduodenoscopy (11/03/2022); Esophagogastroduodenoscopy (N/A, 11/3/2022); and Dilation and curettage of uterus using suction (N/A, 6/23/2023).    Family History:family history includes Heart disease in her mother; Hyperlipidemia in her father and mother; Hypertension in her father.    Allergies:   Review of patient's allergies indicates:   Allergen Reactions    Augmentin [amoxicillin-pot clavulanate] Hives    Morphine      Pt not sure of reaction was told by her mother she is allergic.   Pt has taken Oxycodone before, reports nausea, but can tolerate if she eats before.    Penicillins Other (See Comments)    Suprax [cefixime]        Social History: reports that she has never smoked. She has never used smokeless tobacco. She reports current alcohol use. She reports that she does not use drugs.    Home medications:   Current Outpatient Medications on File Prior to Visit   Medication Sig Dispense Refill    benzoyl peroxide 5 % external liquid WASH FACE EVERY DAY AS DIRECTED      famotidine (PEPCID) 20 MG tablet Take 1 tablet (20 mg total) by mouth 2 (two) times daily. 20 tablet 0    ferrous sulfate (IRON) 325 mg (65 mg iron) Tab tablet Take 1 tablet (325 mg total) by mouth As instructed (Every other day). 30 tablet 1    ibuprofen (ADVIL,MOTRIN) 600 MG tablet Take 1 tablet (600 mg total) by mouth every 6 (six) hours as needed for Pain. 20 tablet 0    levonorgestrel-ethinyl estradiol (AVIANE,ALESSE,LESSINA) 0.1-20 mg-mcg per tablet Take 1 tablet by mouth once daily. 30 tablet 11    ondansetron (ZOFRAN) 4 MG tablet Take 1 tablet (4 mg total) by mouth every 6 (six) hours. 12 tablet 0    ondansetron (ZOFRAN-ODT) 4 MG TbDL Take 1 tablet (4 mg total) by mouth every 6 (six) hours as needed (Nausea and vomiting). 30 tablet 2    ulipristaL (ALIYAH) 30 mg tablet Take 1 tablet (30 mg total) by mouth as needed. 1 tablet 2     Current Facility-Administered Medications on File Prior to Visit   Medication Dose Route Frequency Provider Last Rate Last Admin    0.9%  NaCl infusion   Intravenous Continuous Ronnie Garcia MD        LIDOcaine (PF) 10 mg/ml (1%) injection 10 mg  1 mL Intradermal Once PRN Ronnie Garcia MD           Vital signs:  There were no vitals taken for this visit.    Physical Exam  Constitutional:       General: She is not in acute distress.     Appearance: Normal appearance. She is not ill-appearing.      Comments: Limited Physical Exam d/t Telemedicine Encounter   HENT:       "Head: Normocephalic.   Pulmonary:      Effort: Pulmonary effort is normal. No respiratory distress.   Abdominal:      Tenderness: Negative signs include Arellano's sign.   Neurological:      Mental Status: She is alert and oriented to person, place, and time.   Psychiatric:         Mood and Affect: Mood normal.         Behavior: Behavior normal.         Thought Content: Thought content normal.         Judgment: Judgment normal.         Routine labs:  Lab Results   Component Value Date    WBC 7.12 01/21/2024    HGB 13.8 01/21/2024    HCT 44.0 01/21/2024    MCV 84 01/21/2024     01/21/2024     Lab Results   Component Value Date    INR 1.0 06/18/2023     No results found for: "IRON", "FERRITIN", "TIBC", "FESATURATED"  Lab Results   Component Value Date     01/21/2024    K 3.4 (L) 01/21/2024     01/21/2024    CO2 25 01/21/2024    BUN 17 01/21/2024    CREATININE 0.7 01/21/2024     Lab Results   Component Value Date    ALBUMIN 4.3 01/21/2024    ALT 14 01/21/2024    AST 15 01/21/2024    ALKPHOS 106 01/21/2024    BILITOT 0.3 01/21/2024     No results found for: "GLUCOSE"  Lab Results   Component Value Date    TSH 1.314 11/18/2022     Lab Results   Component Value Date    CALCIUM 8.8 01/21/2024      Latest Reference Range & Units 11/18/22 11:08   H Pylori IgG Interp Negative  Negative @[facility]      Latest Reference Range & Units 11/18/22 11:08   TTG IgA <20 UNITS 9   IgA 40 - 350 mg/dL 137       Imaging:  EXAMINATION:  NM GASTRIC EMPTYING     CLINICAL HISTORY:  Abdominal distension (gaseous)     FINDINGS:  The patient was administered 1.1 mCi of technetium 9 M labeled sulfur colloid with scrambled egg whites 2 pieces of toast jelly and water.     The% gastric emptying at 04:00 hours is 93%, 7% retention.  There is no reflux or aspiration.     Impression:     Normal gastric emptying with 7% retention at 04:00 hours.        Electronically signed by: Valentino Obregon MD  Date:                                         "    12/19/2022  Time:                                           14:48    I have reviewed prior labs, imaging, and notes.      Assessment:  1. Generalized abdominal pain    2. Constipation, unspecified constipation type    3. Abdominal bloating      Constipation accompanied by bloating and abdominal pain. I continue to suspect IBS is playing a role in symptoms. We discussed in detail difference between diverticulosis and diverticulitis as well as it is not diagnosed with a genetic test. A CT scan is the best test to diagnose diverticulitis but it is best to do when she is having a flare of abdominal pain. Also discussed at length current recommendation for colon cancer screening.       Plan:  Orders Placed This Encounter    sodium,potassium,mag sulfates (SUPREP BOWEL PREP KIT) 17.5-3.13-1.6 gram SolR    Case Request Endoscopy: COLONOSCOPY     Start Miralax daily for constipation. She cannot swallow pills and does not wish to start a medication at this time.   Colonoscopy. Suprep      Plan of care discussed with patient who is in agreement and verbalized understanding.     I have explained the planned procedures to the patient.The risks, benefits and alternatives of the procedure were also explained in detail. Patient verbalized understanding, all questions were answered. The patient agrees to proceed as planned    Follow Up: As Needed          Mariajose Chou, ABELARDO,FNP-BC  [facility] [place]

## 2025-02-27 ENCOUNTER — HOSPITAL ENCOUNTER (EMERGENCY)
Facility: HOSPITAL | Age: 25
Discharge: HOME | End: 2025-02-27
Attending: EMERGENCY MEDICINE
Payer: MEDICAID

## 2025-02-27 VITALS
SYSTOLIC BLOOD PRESSURE: 112 MMHG | HEIGHT: 61 IN | OXYGEN SATURATION: 98 % | TEMPERATURE: 98 F | WEIGHT: 125 LBS | HEART RATE: 78 BPM | DIASTOLIC BLOOD PRESSURE: 58 MMHG | RESPIRATION RATE: 16 BRPM | BODY MASS INDEX: 23.6 KG/M2

## 2025-02-27 DIAGNOSIS — J40 BRONCHITIS: Primary | ICD-10-CM

## 2025-02-27 DIAGNOSIS — R05.9 COUGH: ICD-10-CM

## 2025-02-27 PROCEDURE — 99284 EMERGENCY DEPT VISIT MOD MDM: CPT | Mod: 25

## 2025-02-27 RX ORDER — ALBUTEROL SULFATE 90 UG/1
2 INHALANT RESPIRATORY (INHALATION) EVERY 6 HOURS PRN
COMMUNITY

## 2025-02-27 RX ORDER — METHYLPREDNISOLONE 4 MG/1
4 TABLET ORAL DAILY
COMMUNITY

## 2025-02-27 RX ORDER — FERROUS SULFATE 325(65) MG
325 TABLET ORAL DAILY
COMMUNITY

## 2025-02-27 RX ORDER — OSELTAMIVIR PHOSPHATE 75 MG/1
75 CAPSULE ORAL 2 TIMES DAILY
COMMUNITY
Start: 2025-02-18

## 2025-02-27 RX ORDER — ASPIRIN 325 MG
50000 TABLET, DELAYED RELEASE (ENTERIC COATED) ORAL
COMMUNITY
Start: 2025-02-10

## 2025-02-27 RX ORDER — BROMPHENIRAMINE MALEATE, PSEUDOEPHEDRINE HYDROCHLORIDE, AND DEXTROMETHORPHAN HYDROBROMIDE 2; 30; 10 MG/5ML; MG/5ML; MG/5ML
10 SYRUP ORAL EVERY 6 HOURS PRN
Qty: 118 ML | Refills: 1 | Status: SHIPPED | OUTPATIENT
Start: 2025-02-27 | End: 2025-03-09

## 2025-02-27 RX ORDER — BENZONATATE 200 MG/1
200 CAPSULE ORAL 3 TIMES DAILY PRN
COMMUNITY

## 2025-02-27 NOTE — ED PROVIDER NOTES
Encounter Date: 2/27/2025       History     Chief Complaint   Patient presents with    Cough     Cough x 2 weeks. Positive for flu on 2/18.     Inessa Salgado is a 24 year old female presenting to the ED with c/o nonproductive cough x 10 days. She was diagnosed with influenza on 2-18 and states she no longer has fever or flu-like symptoms but continues to have a cough. She is scheduled for a cruise and is here today requesting a CXR to ensure she does not have pneumonia. She has taken tessalon pearls with minimal relief.       Review of patient's allergies indicates:   Allergen Reactions    Augmentin [amoxicillin-pot clavulanate] Hives    Morphine      Pt not sure of reaction was told by her mother she is allergic.  Pt has taken Oxycodone before, reports nausea, but can tolerate if she eats before.    Penicillins Other (See Comments)    Suprax [cefixime]      Past Medical History:   Diagnosis Date    Seizures     febrile seizure     Past Surgical History:   Procedure Laterality Date    COLONOSCOPY N/A 8/15/2024    Procedure: COLONOSCOPY;  Surgeon: Andrea Ace MD;  Location: [facility];  Service: Endoscopy;  Laterality: N/A;    DILATION AND CURETTAGE OF UTERUS USING SUCTION N/A 6/23/2023    Procedure: DILATION AND CURETTAGE, UTERUS, USING SUCTION;  Surgeon: Yolanda Florian MD;  Location: [facility];  Service: OB/GYN;  Laterality: N/A;    ESOPHAGOGASTRODUODENOSCOPY  11/03/2022    ESOPHAGOGASTRODUODENOSCOPY N/A 11/3/2022    Procedure: EGD (ESOPHAGOGASTRODUODENOSCOPY);  Surgeon: Ronnie Garcia MD;  Location: [facility];  Service: Endoscopy;  Laterality: N/A;     Family History   Problem Relation Name Age of Onset    Heart disease Mother      Hyperlipidemia Mother      Hypertension Father      Hyperlipidemia Father       Social History[1]  Review of Systems   Constitutional:  Negative for fever.   HENT:  Positive for postnasal drip. Negative for sore throat.    Respiratory:  Positive for cough. Negative for  shortness of breath.    Cardiovascular:  Negative for chest pain.   Gastrointestinal:  Negative for nausea.   Genitourinary:  Negative for dysuria.   Musculoskeletal:  Negative for back pain.   Skin:  Negative for rash.   Neurological:  Negative for weakness.   Hematological:  Does not bruise/bleed easily.       Physical Exam     Initial Vitals [02/27/25 1521]   BP Pulse Resp Temp SpO2   (!) 110/56 75 18 98.4 °F (36.9 °C) 98 %      MAP       --         Physical Exam    Nursing note and vitals reviewed.  Constitutional: She appears well-developed and well-nourished. She is not diaphoretic. No distress.   HENT:   Head: Normocephalic and atraumatic. Mouth/Throat: Oropharynx is clear and moist.   Eyes: Conjunctivae are normal.   Neck: Neck supple.   Cardiovascular:  Normal rate, regular rhythm, normal heart sounds and intact distal pulses.     Exam reveals no gallop and no friction rub.       No murmur heard.  Pulmonary/Chest: Breath sounds normal. No respiratory distress. She has no wheezes. She has no rhonchi. She has no rales.   Abdominal: Abdomen is soft. She exhibits no distension. There is no abdominal tenderness.   Musculoskeletal:         General: Normal range of motion.      Cervical back: Neck supple.     Neurological: She is alert and oriented to person, place, and time.   Skin: No rash noted. No erythema.   Psychiatric: Her speech is normal.         ED Course   Procedures  Labs Reviewed   POCT URINE PREGNANCY          Imaging Results              X-Ray Chest PA And Lateral (Final result)  Result time 02/27/25 16:32:03      Final result by Shon Patel DO (02/27/25 16:32:03)                   Impression:      No acute cardiopulmonary process.      Electronically signed by: Shon Patel  Date:    02/27/2025  Time:    16:32               Narrative:    EXAMINATION:  XR CHEST PA AND LATERAL    CLINICAL HISTORY:  Cough, unspecified    FINDINGS:  PA and lateral chest with comparison chest x-ray 01/28/2025.   Cardiomediastinal silhouette is within normal limits. Lungs are clear. Pulmonary vasculature is normal. No acute osseous abnormality.                                       Medications - No data to display  Medical Decision Making  This is an urgent evaluation of a 24 year old female presenting to the ED with cough post influenza 10 days ago. She has no adventitious lung sounds and is not hypoxic. There is no tachycardia on exam and she has had no SOB. CXR ordered and reviewed with radiology report indicating no acute process. Likely bronchitis vs post flu cough. Advised patient that cough may linger for several weeks following a viral illness. Discussed symptomatic treatment and will discharge with bromfed DM. Do not suspect PE, pneumonia. No need for antibiotics at this time. Instructed to return to the ED for any worsening symptoms and she verbalized understanding. Based on my clinical evaluation, I do not appreciate any immediate, emergent, or life threatening condition or etiology that warrants additional workup today and feel that the patient can be discharged with close follow up care.       Amount and/or Complexity of Data Reviewed  Radiology: ordered. Decision-making details documented in ED Course.    Risk  Prescription drug management.                                      Clinical Impression:  Final diagnoses:  [R05.9] Cough  [J40] Bronchitis (Primary)          ED Disposition Condition    Discharge Stable          ED Prescriptions       Medication Sig Dispense Start Date End Date Auth. Provider    brompheniramine-pseudoeph-DM (BROMFED DM) 2-30-10 mg/5 mL Syrp Take 10 mLs by mouth every 6 (six) hours as needed (cough & congestion). 118 mL 2/27/2025 3/9/2025 Michelle Marx NP          Follow-up Information       Follow up With Specialties Details Why Contact Info Additional Information    [facility] - Emergency Dept Emergency Medicine  As needed, If symptoms worsen [address] [place]  [place] [address]  [phone] 1st floor    Vesta Ye NP Family Medicine Schedule an appointment as soon as possible for a visit  As needed [address] [place] [address]  [phone]                  [1]   Social History  Tobacco Use    Smoking status: Never    Smokeless tobacco: Never   Substance Use Topics    Alcohol use: Yes     Comment: Occasionally.    Drug use: Never        Michelle Marx NP  02/27/25 [id number]

## 2025-03-01 LAB
OHS QRS DURATION: 84 MS
OHS QTC CALCULATION: 398 MS

## (undated) DEVICE — Device

## (undated) DEVICE — GLOVE BIOGEL SKINSENSE PI 6.0

## (undated) DEVICE — SOL IRR SOD CHL .9% POUR

## (undated) DEVICE — DRAPE THREE-QTR REINF 53X77IN

## (undated) DEVICE — SOL POVIDONE SCRUB IODINE 4 OZ

## (undated) DEVICE — SOL POVIDONE PREP IODINE 4 OZ

## (undated) DEVICE — VACURETTE 8MM CURVED